# Patient Record
Sex: MALE | Race: WHITE | Employment: FULL TIME | URBAN - METROPOLITAN AREA
[De-identification: names, ages, dates, MRNs, and addresses within clinical notes are randomized per-mention and may not be internally consistent; named-entity substitution may affect disease eponyms.]

---

## 2018-06-08 ENCOUNTER — HOSPITAL ENCOUNTER (OUTPATIENT)
Age: 58
Discharge: HOME | End: 2018-06-08
Payer: COMMERCIAL

## 2018-06-08 LAB
ALBUMIN SERPL-MCNC: 4.3 G/DL (ref 3.5–5.2)
ALP BLD-CCNC: 114 U/L (ref 40–129)
ALT SERPL-CCNC: 17 U/L (ref 0–40)
ANION GAP SERPL CALCULATED.3IONS-SCNC: 11 MMOL/L (ref 7–16)
AST SERPL-CCNC: 16 U/L (ref 0–39)
BASOPHILS ABSOLUTE: 0.06 E9/L (ref 0–0.2)
BASOPHILS RELATIVE PERCENT: 0.7 % (ref 0–2)
BILIRUB SERPL-MCNC: 0.5 MG/DL (ref 0–1.2)
BUN BLDV-MCNC: 24 MG/DL (ref 6–20)
CALCIUM SERPL-MCNC: 9.3 MG/DL (ref 8.6–10.2)
CHLORIDE BLD-SCNC: 102 MMOL/L (ref 98–107)
CHOLESTEROL, FASTING: 124 MG/DL (ref 0–199)
CO2: 26 MMOL/L (ref 22–29)
CREAT SERPL-MCNC: 1 MG/DL (ref 0.7–1.2)
EOSINOPHILS ABSOLUTE: 0.24 E9/L (ref 0.05–0.5)
EOSINOPHILS RELATIVE PERCENT: 3 % (ref 0–6)
GFR AFRICAN AMERICAN: >60
GFR NON-AFRICAN AMERICAN: >60 ML/MIN/1.73
GLUCOSE FASTING: 116 MG/DL (ref 74–109)
HCT VFR BLD CALC: 46.2 % (ref 37–54)
HDLC SERPL-MCNC: 34 MG/DL
HEMOGLOBIN: 15.3 G/DL (ref 12.5–16.5)
IMMATURE GRANULOCYTES #: 0.01 E9/L
IMMATURE GRANULOCYTES %: 0.1 % (ref 0–5)
LDL CHOLESTEROL CALCULATED: 73 MG/DL (ref 0–99)
LYMPHOCYTES ABSOLUTE: 1.46 E9/L (ref 1.5–4)
LYMPHOCYTES RELATIVE PERCENT: 18 % (ref 20–42)
MCH RBC QN AUTO: 29 PG (ref 26–35)
MCHC RBC AUTO-ENTMCNC: 33.1 % (ref 32–34.5)
MCV RBC AUTO: 87.7 FL (ref 80–99.9)
MONOCYTES ABSOLUTE: 0.64 E9/L (ref 0.1–0.95)
MONOCYTES RELATIVE PERCENT: 7.9 % (ref 2–12)
NEUTROPHILS ABSOLUTE: 5.69 E9/L (ref 1.8–7.3)
NEUTROPHILS RELATIVE PERCENT: 70.3 % (ref 43–80)
PDW BLD-RTO: 13.1 FL (ref 11.5–15)
PHOSPHORUS: 2.8 MG/DL (ref 2.5–4.5)
PLATELET # BLD: 249 E9/L (ref 130–450)
PMV BLD AUTO: 9.9 FL (ref 7–12)
POTASSIUM SERPL-SCNC: 4 MMOL/L (ref 3.5–5)
RBC # BLD: 5.27 E12/L (ref 3.8–5.8)
SODIUM BLD-SCNC: 139 MMOL/L (ref 132–146)
TOTAL PROTEIN: 7.8 G/DL (ref 6.4–8.3)
TRIGLYCERIDE, FASTING: 84 MG/DL (ref 0–149)
VLDLC SERPL CALC-MCNC: 17 MG/DL
WBC # BLD: 8.1 E9/L (ref 4.5–11.5)

## 2018-06-08 PROCEDURE — 80061 LIPID PANEL: CPT

## 2018-06-08 PROCEDURE — 84100 ASSAY OF PHOSPHORUS: CPT

## 2018-06-08 PROCEDURE — 80053 COMPREHEN METABOLIC PANEL: CPT

## 2018-06-08 PROCEDURE — 85025 COMPLETE CBC W/AUTO DIFF WBC: CPT

## 2018-06-08 PROCEDURE — 36415 COLL VENOUS BLD VENIPUNCTURE: CPT

## 2018-12-28 ENCOUNTER — HOSPITAL ENCOUNTER (OUTPATIENT)
Age: 58
Discharge: HOME | End: 2018-12-28
Payer: COMMERCIAL

## 2018-12-28 LAB
ALBUMIN SERPL-MCNC: 4.6 G/DL (ref 3.5–5.2)
ALP BLD-CCNC: 131 U/L (ref 40–129)
ALT SERPL-CCNC: 25 U/L (ref 0–40)
ANION GAP SERPL CALCULATED.3IONS-SCNC: 13 MMOL/L (ref 7–16)
AST SERPL-CCNC: 21 U/L (ref 0–39)
BASOPHILS ABSOLUTE: 0.07 E9/L (ref 0–0.2)
BASOPHILS RELATIVE PERCENT: 0.8 % (ref 0–2)
BILIRUB SERPL-MCNC: 0.3 MG/DL (ref 0–1.2)
BUN BLDV-MCNC: 16 MG/DL (ref 6–20)
CALCIUM SERPL-MCNC: 9.4 MG/DL (ref 8.6–10.2)
CHLORIDE BLD-SCNC: 100 MMOL/L (ref 98–107)
CO2: 27 MMOL/L (ref 22–29)
CREAT SERPL-MCNC: 0.9 MG/DL (ref 0.7–1.2)
EOSINOPHILS ABSOLUTE: 0.29 E9/L (ref 0.05–0.5)
EOSINOPHILS RELATIVE PERCENT: 3.1 % (ref 0–6)
GFR AFRICAN AMERICAN: >60
GFR NON-AFRICAN AMERICAN: >60 ML/MIN/1.73
GLUCOSE BLD-MCNC: 130 MG/DL (ref 74–99)
HCT VFR BLD CALC: 49.1 % (ref 37–54)
HEMOGLOBIN: 16.1 G/DL (ref 12.5–16.5)
IMMATURE GRANULOCYTES #: 0.03 E9/L
IMMATURE GRANULOCYTES %: 0.3 % (ref 0–5)
LYMPHOCYTES ABSOLUTE: 1.73 E9/L (ref 1.5–4)
LYMPHOCYTES RELATIVE PERCENT: 18.8 % (ref 20–42)
MCH RBC QN AUTO: 29.9 PG (ref 26–35)
MCHC RBC AUTO-ENTMCNC: 32.8 % (ref 32–34.5)
MCV RBC AUTO: 91.1 FL (ref 80–99.9)
MONOCYTES ABSOLUTE: 0.67 E9/L (ref 0.1–0.95)
MONOCYTES RELATIVE PERCENT: 7.3 % (ref 2–12)
NEUTROPHILS ABSOLUTE: 6.42 E9/L (ref 1.8–7.3)
NEUTROPHILS RELATIVE PERCENT: 69.7 % (ref 43–80)
PDW BLD-RTO: 12.6 FL (ref 11.5–15)
PHOSPHORUS: 2.3 MG/DL (ref 2.5–4.5)
PLATELET # BLD: 253 E9/L (ref 130–450)
PMV BLD AUTO: 9.7 FL (ref 7–12)
POTASSIUM SERPL-SCNC: 3.7 MMOL/L (ref 3.5–5)
RBC # BLD: 5.39 E12/L (ref 3.8–5.8)
SODIUM BLD-SCNC: 140 MMOL/L (ref 132–146)
TOTAL PROTEIN: 8.1 G/DL (ref 6.4–8.3)
VITAMIN D 25-HYDROXY: 39 NG/ML (ref 30–100)
WBC # BLD: 9.2 E9/L (ref 4.5–11.5)

## 2018-12-28 PROCEDURE — 82306 VITAMIN D 25 HYDROXY: CPT

## 2018-12-28 PROCEDURE — 36415 COLL VENOUS BLD VENIPUNCTURE: CPT

## 2018-12-28 PROCEDURE — 85025 COMPLETE CBC W/AUTO DIFF WBC: CPT

## 2018-12-28 PROCEDURE — 84100 ASSAY OF PHOSPHORUS: CPT

## 2018-12-28 PROCEDURE — 80053 COMPREHEN METABOLIC PANEL: CPT

## 2019-07-15 ENCOUNTER — HOSPITAL ENCOUNTER (OUTPATIENT)
Age: 59
Discharge: HOME | End: 2019-07-15
Payer: COMMERCIAL

## 2019-07-15 DIAGNOSIS — I10 ACCELERATED HYPERTENSION: ICD-10-CM

## 2019-07-15 DIAGNOSIS — R73.9 HYPERGLYCEMIA: ICD-10-CM

## 2019-07-15 DIAGNOSIS — N18.30 CRI (CHRONIC RENAL INSUFFICIENCY), STAGE 3 (MODERATE) (HCC): ICD-10-CM

## 2019-07-15 DIAGNOSIS — E78.5 HYPERLIPIDEMIA, UNSPECIFIED HYPERLIPIDEMIA TYPE: ICD-10-CM

## 2019-07-15 DIAGNOSIS — E03.9 ACQUIRED HYPOTHYROIDISM: ICD-10-CM

## 2019-07-15 LAB
ALBUMIN SERPL-MCNC: 4.3 G/DL (ref 3.5–5.2)
ALP BLD-CCNC: 130 U/L (ref 40–129)
ALT SERPL-CCNC: 20 U/L (ref 0–40)
ANION GAP SERPL CALCULATED.3IONS-SCNC: 12 MMOL/L (ref 7–16)
AST SERPL-CCNC: 15 U/L (ref 0–39)
BASOPHILS ABSOLUTE: 0.06 E9/L (ref 0–0.2)
BASOPHILS RELATIVE PERCENT: 0.6 % (ref 0–2)
BILIRUB SERPL-MCNC: 0.4 MG/DL (ref 0–1.2)
BUN BLDV-MCNC: 16 MG/DL (ref 6–20)
CALCIUM SERPL-MCNC: 8.9 MG/DL (ref 8.6–10.2)
CHLORIDE BLD-SCNC: 99 MMOL/L (ref 98–107)
CHOLESTEROL, TOTAL: 169 MG/DL (ref 0–199)
CO2: 29 MMOL/L (ref 22–29)
CREAT SERPL-MCNC: 1.1 MG/DL (ref 0.7–1.2)
CREATININE URINE: 109 MG/DL (ref 40–278)
EOSINOPHILS ABSOLUTE: 0.27 E9/L (ref 0.05–0.5)
EOSINOPHILS RELATIVE PERCENT: 2.9 % (ref 0–6)
GFR AFRICAN AMERICAN: >60
GFR NON-AFRICAN AMERICAN: >60 ML/MIN/1.73
GLUCOSE BLD-MCNC: 173 MG/DL (ref 74–99)
HBA1C MFR BLD: 7.3 % (ref 4–5.6)
HCT VFR BLD CALC: 46.3 % (ref 37–54)
HDLC SERPL-MCNC: 40 MG/DL
HEMOGLOBIN: 15.3 G/DL (ref 12.5–16.5)
IMMATURE GRANULOCYTES #: 0.04 E9/L
IMMATURE GRANULOCYTES %: 0.4 % (ref 0–5)
LDL CHOLESTEROL CALCULATED: 90 MG/DL (ref 0–99)
LYMPHOCYTES ABSOLUTE: 1.46 E9/L (ref 1.5–4)
LYMPHOCYTES RELATIVE PERCENT: 15.4 % (ref 20–42)
MAGNESIUM: 2.1 MG/DL (ref 1.6–2.6)
MCH RBC QN AUTO: 30.3 PG (ref 26–35)
MCHC RBC AUTO-ENTMCNC: 33 % (ref 32–34.5)
MCV RBC AUTO: 91.7 FL (ref 80–99.9)
MICROALBUMIN UR-MCNC: 49.1 MG/L
MICROALBUMIN/CREAT UR-RTO: 45 (ref 0–30)
MONOCYTES ABSOLUTE: 0.66 E9/L (ref 0.1–0.95)
MONOCYTES RELATIVE PERCENT: 7 % (ref 2–12)
NEUTROPHILS ABSOLUTE: 6.96 E9/L (ref 1.8–7.3)
NEUTROPHILS RELATIVE PERCENT: 73.7 % (ref 43–80)
PARATHYROID HORMONE INTACT: 126 PG/ML (ref 15–65)
PDW BLD-RTO: 13.3 FL (ref 11.5–15)
PLATELET # BLD: 236 E9/L (ref 130–450)
PMV BLD AUTO: 9.9 FL (ref 7–12)
POTASSIUM SERPL-SCNC: 3.4 MMOL/L (ref 3.5–5)
RBC # BLD: 5.05 E12/L (ref 3.8–5.8)
SODIUM BLD-SCNC: 140 MMOL/L (ref 132–146)
TOTAL PROTEIN: 8.3 G/DL (ref 6.4–8.3)
TRIGL SERPL-MCNC: 193 MG/DL (ref 0–149)
TSH SERPL DL<=0.05 MIU/L-ACNC: 4.36 UIU/ML (ref 0.27–4.2)
URIC ACID, SERUM: 8.3 MG/DL (ref 3.4–7)
VITAMIN D 25-HYDROXY: 33 NG/ML (ref 30–100)
VLDLC SERPL CALC-MCNC: 39 MG/DL
WBC # BLD: 9.5 E9/L (ref 4.5–11.5)

## 2019-07-15 PROCEDURE — 85025 COMPLETE CBC W/AUTO DIFF WBC: CPT

## 2019-07-15 PROCEDURE — 84550 ASSAY OF BLOOD/URIC ACID: CPT

## 2019-07-15 PROCEDURE — 83970 ASSAY OF PARATHORMONE: CPT

## 2019-07-15 PROCEDURE — 84443 ASSAY THYROID STIM HORMONE: CPT

## 2019-07-15 PROCEDURE — 83735 ASSAY OF MAGNESIUM: CPT

## 2019-07-15 PROCEDURE — 80061 LIPID PANEL: CPT

## 2019-07-15 PROCEDURE — 82044 UR ALBUMIN SEMIQUANTITATIVE: CPT

## 2019-07-15 PROCEDURE — 83036 HEMOGLOBIN GLYCOSYLATED A1C: CPT

## 2019-07-15 PROCEDURE — 36415 COLL VENOUS BLD VENIPUNCTURE: CPT

## 2019-07-15 PROCEDURE — 80053 COMPREHEN METABOLIC PANEL: CPT

## 2019-07-15 PROCEDURE — 82570 ASSAY OF URINE CREATININE: CPT

## 2019-07-15 PROCEDURE — 82306 VITAMIN D 25 HYDROXY: CPT

## 2019-09-21 ENCOUNTER — HOSPITAL ENCOUNTER (OUTPATIENT)
Age: 59
Discharge: HOME | End: 2019-09-21
Payer: COMMERCIAL

## 2019-09-21 DIAGNOSIS — I10 ACCELERATED HYPERTENSION: ICD-10-CM

## 2019-09-21 DIAGNOSIS — N18.30 CHRONIC RENAL IMPAIRMENT, STAGE 3 (MODERATE) (HCC): ICD-10-CM

## 2019-09-21 DIAGNOSIS — E03.9 ACQUIRED HYPOTHYROIDISM: ICD-10-CM

## 2019-09-21 LAB
ANION GAP SERPL CALCULATED.3IONS-SCNC: 13 MMOL/L (ref 7–16)
BUN BLDV-MCNC: 14 MG/DL (ref 6–20)
CALCIUM SERPL-MCNC: 9 MG/DL (ref 8.6–10.2)
CHLORIDE BLD-SCNC: 99 MMOL/L (ref 98–107)
CO2: 27 MMOL/L (ref 22–29)
CREAT SERPL-MCNC: 1 MG/DL (ref 0.7–1.2)
GFR AFRICAN AMERICAN: >60
GFR NON-AFRICAN AMERICAN: >60 ML/MIN/1.73
GLUCOSE BLD-MCNC: 176 MG/DL (ref 74–99)
POTASSIUM SERPL-SCNC: 3.5 MMOL/L (ref 3.5–5)
SODIUM BLD-SCNC: 139 MMOL/L (ref 132–146)
URIC ACID, SERUM: 8.1 MG/DL (ref 3.4–7)

## 2019-09-21 PROCEDURE — 36415 COLL VENOUS BLD VENIPUNCTURE: CPT

## 2019-09-21 PROCEDURE — 84550 ASSAY OF BLOOD/URIC ACID: CPT

## 2019-09-21 PROCEDURE — 80048 BASIC METABOLIC PNL TOTAL CA: CPT

## 2019-09-26 PROBLEM — E66.01 CLASS 3 SEVERE OBESITY DUE TO EXCESS CALORIES WITHOUT SERIOUS COMORBIDITY WITH BODY MASS INDEX (BMI) OF 45.0 TO 49.9 IN ADULT (HCC): Status: ACTIVE | Noted: 2019-09-26

## 2019-09-26 PROBLEM — R73.9 HYPERGLYCEMIA: Status: ACTIVE | Noted: 2019-09-26

## 2019-09-26 PROBLEM — E66.813 CLASS 3 SEVERE OBESITY DUE TO EXCESS CALORIES WITHOUT SERIOUS COMORBIDITY WITH BODY MASS INDEX (BMI) OF 45.0 TO 49.9 IN ADULT: Status: ACTIVE | Noted: 2019-09-26

## 2019-09-26 PROBLEM — E11.9 TYPE 2 DIABETES MELLITUS WITHOUT COMPLICATION, WITHOUT LONG-TERM CURRENT USE OF INSULIN (HCC): Status: ACTIVE | Noted: 2019-09-26

## 2019-09-26 PROBLEM — I10 ESSENTIAL HYPERTENSION: Status: ACTIVE | Noted: 2019-09-26

## 2019-11-30 ENCOUNTER — HOSPITAL ENCOUNTER (OUTPATIENT)
Age: 59
Discharge: HOME | End: 2019-11-30
Payer: COMMERCIAL

## 2019-11-30 LAB
ANION GAP SERPL CALCULATED.3IONS-SCNC: 11 MMOL/L (ref 7–16)
BASOPHILS ABSOLUTE: 0.05 E9/L (ref 0–0.2)
BASOPHILS RELATIVE PERCENT: 0.5 % (ref 0–2)
BUN BLDV-MCNC: 15 MG/DL (ref 6–20)
CALCIUM SERPL-MCNC: 9.6 MG/DL (ref 8.6–10.2)
CHLORIDE BLD-SCNC: 97 MMOL/L (ref 98–107)
CO2: 33 MMOL/L (ref 22–29)
CREAT SERPL-MCNC: 1.1 MG/DL (ref 0.7–1.2)
EOSINOPHILS ABSOLUTE: 0.32 E9/L (ref 0.05–0.5)
EOSINOPHILS RELATIVE PERCENT: 3.2 % (ref 0–6)
GFR AFRICAN AMERICAN: >60
GFR NON-AFRICAN AMERICAN: >60 ML/MIN/1.73
GLUCOSE BLD-MCNC: 133 MG/DL (ref 74–99)
HCT VFR BLD CALC: 47 % (ref 37–54)
HEMOGLOBIN: 15.2 G/DL (ref 12.5–16.5)
IMMATURE GRANULOCYTES #: 0.04 E9/L
IMMATURE GRANULOCYTES %: 0.4 % (ref 0–5)
LYMPHOCYTES ABSOLUTE: 1.45 E9/L (ref 1.5–4)
LYMPHOCYTES RELATIVE PERCENT: 14.4 % (ref 20–42)
MCH RBC QN AUTO: 30.2 PG (ref 26–35)
MCHC RBC AUTO-ENTMCNC: 32.3 % (ref 32–34.5)
MCV RBC AUTO: 93.3 FL (ref 80–99.9)
MONOCYTES ABSOLUTE: 0.62 E9/L (ref 0.1–0.95)
MONOCYTES RELATIVE PERCENT: 6.2 % (ref 2–12)
NEUTROPHILS ABSOLUTE: 7.58 E9/L (ref 1.8–7.3)
NEUTROPHILS RELATIVE PERCENT: 75.3 % (ref 43–80)
PDW BLD-RTO: 13.4 FL (ref 11.5–15)
PHOSPHORUS: 3 MG/DL (ref 2.5–4.5)
PLATELET # BLD: 253 E9/L (ref 130–450)
PMV BLD AUTO: 10.3 FL (ref 7–12)
POTASSIUM SERPL-SCNC: 4.2 MMOL/L (ref 3.5–5)
RBC # BLD: 5.04 E12/L (ref 3.8–5.8)
SODIUM BLD-SCNC: 141 MMOL/L (ref 132–146)
WBC # BLD: 10.1 E9/L (ref 4.5–11.5)

## 2019-11-30 PROCEDURE — 80048 BASIC METABOLIC PNL TOTAL CA: CPT

## 2019-11-30 PROCEDURE — 36415 COLL VENOUS BLD VENIPUNCTURE: CPT

## 2019-11-30 PROCEDURE — 84100 ASSAY OF PHOSPHORUS: CPT

## 2019-11-30 PROCEDURE — 85025 COMPLETE CBC W/AUTO DIFF WBC: CPT

## 2020-09-12 ENCOUNTER — HOSPITAL ENCOUNTER (OUTPATIENT)
Age: 60
Discharge: HOME | End: 2020-09-12
Payer: COMMERCIAL

## 2020-09-12 LAB
BASOPHILS ABSOLUTE: 0.04 E9/L (ref 0–0.2)
BASOPHILS RELATIVE PERCENT: 0.5 % (ref 0–2)
EOSINOPHILS ABSOLUTE: 0.21 E9/L (ref 0.05–0.5)
EOSINOPHILS RELATIVE PERCENT: 2.6 % (ref 0–6)
HCT VFR BLD CALC: 38.5 % (ref 37–54)
HEMOGLOBIN: 11.7 G/DL (ref 12.5–16.5)
IMMATURE GRANULOCYTES #: 0.03 E9/L
IMMATURE GRANULOCYTES %: 0.4 % (ref 0–5)
LYMPHOCYTES ABSOLUTE: 0.95 E9/L (ref 1.5–4)
LYMPHOCYTES RELATIVE PERCENT: 11.5 % (ref 20–42)
MCH RBC QN AUTO: 27 PG (ref 26–35)
MCHC RBC AUTO-ENTMCNC: 30.4 % (ref 32–34.5)
MCV RBC AUTO: 88.9 FL (ref 80–99.9)
MONOCYTES ABSOLUTE: 0.6 E9/L (ref 0.1–0.95)
MONOCYTES RELATIVE PERCENT: 7.3 % (ref 2–12)
NEUTROPHILS ABSOLUTE: 6.4 E9/L (ref 1.8–7.3)
NEUTROPHILS RELATIVE PERCENT: 77.7 % (ref 43–80)
PDW BLD-RTO: 14.7 FL (ref 11.5–15)
PLATELET # BLD: 275 E9/L (ref 130–450)
PMV BLD AUTO: 9.7 FL (ref 7–12)
RBC # BLD: 4.33 E12/L (ref 3.8–5.8)
T4 FREE: 1.15 NG/DL (ref 0.93–1.7)
TSH SERPL DL<=0.05 MIU/L-ACNC: 6.93 UIU/ML (ref 0.27–4.2)
WBC # BLD: 8.2 E9/L (ref 4.5–11.5)

## 2020-09-12 PROCEDURE — 84439 ASSAY OF FREE THYROXINE: CPT

## 2020-09-12 PROCEDURE — 85025 COMPLETE CBC W/AUTO DIFF WBC: CPT

## 2020-09-12 PROCEDURE — 36415 COLL VENOUS BLD VENIPUNCTURE: CPT

## 2020-09-12 PROCEDURE — 84443 ASSAY THYROID STIM HORMONE: CPT

## 2020-10-07 ENCOUNTER — APPOINTMENT (OUTPATIENT)
Dept: ULTRASOUND IMAGING | Age: 60
DRG: 286 | End: 2020-10-07
Payer: COMMERCIAL

## 2020-10-07 ENCOUNTER — HOSPITAL ENCOUNTER (INPATIENT)
Age: 60
LOS: 5 days | Discharge: HOME | DRG: 286 | End: 2020-10-12
Attending: EMERGENCY MEDICINE | Admitting: INTERNAL MEDICINE
Payer: COMMERCIAL

## 2020-10-07 ENCOUNTER — APPOINTMENT (OUTPATIENT)
Dept: GENERAL RADIOLOGY | Age: 60
DRG: 286 | End: 2020-10-07
Payer: COMMERCIAL

## 2020-10-07 PROBLEM — E87.70 LOCALIZED EDEMA DUE TO FLUID OVERLOAD: Status: ACTIVE | Noted: 2020-10-07

## 2020-10-07 LAB
ALBUMIN SERPL-MCNC: 3.5 G/DL (ref 3.5–5.2)
ALP BLD-CCNC: 74 U/L (ref 40–129)
ALT SERPL-CCNC: 18 U/L (ref 0–40)
ANION GAP SERPL CALCULATED.3IONS-SCNC: 9 MMOL/L (ref 7–16)
AST SERPL-CCNC: 31 U/L (ref 0–39)
BACTERIA: NORMAL /HPF
BASOPHILS ABSOLUTE: 0.06 E9/L (ref 0–0.2)
BASOPHILS RELATIVE PERCENT: 0.5 % (ref 0–2)
BILIRUB SERPL-MCNC: 0.4 MG/DL (ref 0–1.2)
BILIRUBIN URINE: NEGATIVE
BLOOD, URINE: NEGATIVE
BUN BLDV-MCNC: 16 MG/DL (ref 6–20)
CALCIUM SERPL-MCNC: 8.5 MG/DL (ref 8.6–10.2)
CHLORIDE BLD-SCNC: 98 MMOL/L (ref 98–107)
CLARITY: CLEAR
CO2: 32 MMOL/L (ref 22–29)
COLOR: YELLOW
CREAT SERPL-MCNC: 1.3 MG/DL (ref 0.7–1.2)
EOSINOPHILS ABSOLUTE: 0.17 E9/L (ref 0.05–0.5)
EOSINOPHILS RELATIVE PERCENT: 1.4 % (ref 0–6)
GFR AFRICAN AMERICAN: >60
GFR NON-AFRICAN AMERICAN: 57 ML/MIN/1.73
GLUCOSE BLD-MCNC: 218 MG/DL (ref 74–99)
GLUCOSE URINE: NEGATIVE MG/DL
HCT VFR BLD CALC: 40.7 % (ref 37–54)
HEMOGLOBIN: 11.9 G/DL (ref 12.5–16.5)
IMMATURE GRANULOCYTES #: 0.06 E9/L
IMMATURE GRANULOCYTES %: 0.5 % (ref 0–5)
KETONES, URINE: NEGATIVE MG/DL
LACTIC ACID, SEPSIS: 1.4 MMOL/L (ref 0.5–1.9)
LEUKOCYTE ESTERASE, URINE: NEGATIVE
LIPASE: 26 U/L (ref 13–60)
LYMPHOCYTES ABSOLUTE: 1.03 E9/L (ref 1.5–4)
LYMPHOCYTES RELATIVE PERCENT: 8.7 % (ref 20–42)
MCH RBC QN AUTO: 25.9 PG (ref 26–35)
MCHC RBC AUTO-ENTMCNC: 29.2 % (ref 32–34.5)
MCV RBC AUTO: 88.7 FL (ref 80–99.9)
MONOCYTES ABSOLUTE: 0.72 E9/L (ref 0.1–0.95)
MONOCYTES RELATIVE PERCENT: 6.1 % (ref 2–12)
NEUTROPHILS ABSOLUTE: 9.86 E9/L (ref 1.8–7.3)
NEUTROPHILS RELATIVE PERCENT: 82.8 % (ref 43–80)
NITRITE, URINE: NEGATIVE
PDW BLD-RTO: 16.1 FL (ref 11.5–15)
PH UA: 6 (ref 5–9)
PLATELET # BLD: 268 E9/L (ref 130–450)
PMV BLD AUTO: 9.4 FL (ref 7–12)
POTASSIUM REFLEX MAGNESIUM: 4.2 MMOL/L (ref 3.5–5)
POTASSIUM SERPL-SCNC: 3.8 MMOL/L (ref 3.5–5)
PRO-BNP: 2277 PG/ML (ref 0–125)
PROTEIN UA: 30 MG/DL
RBC # BLD: 4.59 E12/L (ref 3.8–5.8)
RBC UA: NORMAL /HPF (ref 0–2)
SARS-COV-2, NAAT: NOT DETECTED
SODIUM BLD-SCNC: 139 MMOL/L (ref 132–146)
SPECIFIC GRAVITY UA: 1.02 (ref 1–1.03)
TOTAL PROTEIN: 7.1 G/DL (ref 6.4–8.3)
TROPONIN: 0.13 NG/ML (ref 0–0.03)
TROPONIN: 0.17 NG/ML (ref 0–0.03)
UROBILINOGEN, URINE: 1 E.U./DL
WBC # BLD: 11.9 E9/L (ref 4.5–11.5)
WBC UA: NORMAL /HPF (ref 0–5)

## 2020-10-07 PROCEDURE — U0002 COVID-19 LAB TEST NON-CDC: HCPCS

## 2020-10-07 PROCEDURE — 93005 ELECTROCARDIOGRAM TRACING: CPT | Performed by: NURSE PRACTITIONER

## 2020-10-07 PROCEDURE — 87040 BLOOD CULTURE FOR BACTERIA: CPT

## 2020-10-07 PROCEDURE — 84484 ASSAY OF TROPONIN QUANT: CPT

## 2020-10-07 PROCEDURE — 2060000000 HC ICU INTERMEDIATE R&B

## 2020-10-07 PROCEDURE — 81001 URINALYSIS AUTO W/SCOPE: CPT

## 2020-10-07 PROCEDURE — 80053 COMPREHEN METABOLIC PANEL: CPT

## 2020-10-07 PROCEDURE — 99285 EMERGENCY DEPT VISIT HI MDM: CPT

## 2020-10-07 PROCEDURE — 93970 EXTREMITY STUDY: CPT | Performed by: RADIOLOGY

## 2020-10-07 PROCEDURE — 83880 ASSAY OF NATRIURETIC PEPTIDE: CPT

## 2020-10-07 PROCEDURE — 93970 EXTREMITY STUDY: CPT

## 2020-10-07 PROCEDURE — 85025 COMPLETE CBC W/AUTO DIFF WBC: CPT

## 2020-10-07 PROCEDURE — 83605 ASSAY OF LACTIC ACID: CPT

## 2020-10-07 PROCEDURE — 71046 X-RAY EXAM CHEST 2 VIEWS: CPT

## 2020-10-07 PROCEDURE — 84132 ASSAY OF SERUM POTASSIUM: CPT

## 2020-10-07 PROCEDURE — 36415 COLL VENOUS BLD VENIPUNCTURE: CPT

## 2020-10-07 PROCEDURE — 6360000002 HC RX W HCPCS: Performed by: EMERGENCY MEDICINE

## 2020-10-07 PROCEDURE — 83690 ASSAY OF LIPASE: CPT

## 2020-10-07 RX ORDER — FUROSEMIDE 10 MG/ML
40 INJECTION INTRAMUSCULAR; INTRAVENOUS ONCE
Status: COMPLETED | OUTPATIENT
Start: 2020-10-07 | End: 2020-10-07

## 2020-10-07 RX ADMIN — FUROSEMIDE 40 MG: 10 INJECTION, SOLUTION INTRAMUSCULAR; INTRAVENOUS at 20:38

## 2020-10-07 ASSESSMENT — PAIN SCALES - GENERAL: PAINLEVEL_OUTOF10: 0

## 2020-10-07 NOTE — ED NOTES
FIRST PROVIDER CONTACT ASSESSMENT NOTE      Department of Emergency Medicine   10/7/20  2:51 PM EDT    Chief Complaint: Fatigue (increased fatigue x5 days, cough/congestion/SOB, BLE swelling )      History of Present Illness:   Jimmie Smith is a 64 y.o. male who presents to the ED for [address] for the last 5 days with shortness of breath and bilateral lower extremity swelling. Medical History:  has a past medical history of Hypertension and Hypothyroidism. Surgical History:  has no past surgical history on file. Social History:  reports that he has never smoked. He has never used smokeless tobacco. He reports that he does not drink alcohol or use drugs. Family History: family history includes Arthritis in his mother; Cancer in his mother; Diabetes in his father; Stroke in his father. *ALLERGIES*     Patient has no known allergies.      Physical Exam:      VS:  BP (!) 165/89   Pulse 96   Temp 98.2 °F (36.8 °C) (Temporal)   Resp (!) 2   SpO2 90%      Initial Plan of Care:  Initiate Treatment-Testing, Proceed toTreatment Area When Bed Available for ED Attending/MLP to Continue Care    -----------------[address] ASSESSMENT NOTE--------------  Electronically signed by BRITTNEY Garza CNP   DD: 10/7/20           BRITTNEY Garza CNP  10/07/20 0906

## 2020-10-08 PROBLEM — E87.70 FLUID OVERLOAD: Status: ACTIVE | Noted: 2020-10-08

## 2020-10-08 LAB
ANION GAP SERPL CALCULATED.3IONS-SCNC: 8 MMOL/L (ref 7–16)
BUN BLDV-MCNC: 14 MG/DL (ref 6–20)
CALCIUM SERPL-MCNC: 8.3 MG/DL (ref 8.6–10.2)
CHLORIDE BLD-SCNC: 100 MMOL/L (ref 98–107)
CO2: 35 MMOL/L (ref 22–29)
CREAT SERPL-MCNC: 1.3 MG/DL (ref 0.7–1.2)
EKG ATRIAL RATE: 84 BPM
EKG P AXIS: 59 DEGREES
EKG P-R INTERVAL: 200 MS
EKG Q-T INTERVAL: 388 MS
EKG QRS DURATION: 82 MS
EKG QTC CALCULATION (BAZETT): 458 MS
EKG R AXIS: 24 DEGREES
EKG T AXIS: 85 DEGREES
EKG VENTRICULAR RATE: 84 BPM
GFR AFRICAN AMERICAN: >60
GFR NON-AFRICAN AMERICAN: 57 ML/MIN/1.73
GLUCOSE BLD-MCNC: 165 MG/DL (ref 74–99)
METER GLUCOSE: 160 MG/DL (ref 74–99)
METER GLUCOSE: 166 MG/DL (ref 74–99)
METER GLUCOSE: 189 MG/DL (ref 74–99)
METER GLUCOSE: 202 MG/DL (ref 74–99)
POTASSIUM SERPL-SCNC: 3.6 MMOL/L (ref 3.5–5)
SODIUM BLD-SCNC: 143 MMOL/L (ref 132–146)
TROPONIN: 0.15 NG/ML (ref 0–0.03)

## 2020-10-08 PROCEDURE — 36415 COLL VENOUS BLD VENIPUNCTURE: CPT

## 2020-10-08 PROCEDURE — 6360000002 HC RX W HCPCS: Performed by: STUDENT IN AN ORGANIZED HEALTH CARE EDUCATION/TRAINING PROGRAM

## 2020-10-08 PROCEDURE — 6360000002 HC RX W HCPCS: Performed by: INTERNAL MEDICINE

## 2020-10-08 PROCEDURE — 6370000000 HC RX 637 (ALT 250 FOR IP): Performed by: INTERNAL MEDICINE

## 2020-10-08 PROCEDURE — 2700000000 HC OXYGEN THERAPY PER DAY

## 2020-10-08 PROCEDURE — 84484 ASSAY OF TROPONIN QUANT: CPT

## 2020-10-08 PROCEDURE — 82962 GLUCOSE BLOOD TEST: CPT

## 2020-10-08 PROCEDURE — 93010 ELECTROCARDIOGRAM REPORT: CPT | Performed by: INTERNAL MEDICINE

## 2020-10-08 PROCEDURE — 6360000002 HC RX W HCPCS: Performed by: PHYSICIAN ASSISTANT

## 2020-10-08 PROCEDURE — APPSS60 APP SPLIT SHARED TIME 46-60 MINUTES: Performed by: PHYSICIAN ASSISTANT

## 2020-10-08 PROCEDURE — 99254 IP/OBS CNSLTJ NEW/EST MOD 60: CPT | Performed by: STUDENT IN AN ORGANIZED HEALTH CARE EDUCATION/TRAINING PROGRAM

## 2020-10-08 PROCEDURE — 6370000000 HC RX 637 (ALT 250 FOR IP): Performed by: PHYSICIAN ASSISTANT

## 2020-10-08 PROCEDURE — 80048 BASIC METABOLIC PNL TOTAL CA: CPT

## 2020-10-08 PROCEDURE — 6370000000 HC RX 637 (ALT 250 FOR IP): Performed by: STUDENT IN AN ORGANIZED HEALTH CARE EDUCATION/TRAINING PROGRAM

## 2020-10-08 PROCEDURE — 94664 DEMO&/EVAL PT USE INHALER: CPT

## 2020-10-08 PROCEDURE — 94640 AIRWAY INHALATION TREATMENT: CPT

## 2020-10-08 PROCEDURE — 2580000003 HC RX 258: Performed by: INTERNAL MEDICINE

## 2020-10-08 PROCEDURE — 2060000000 HC ICU INTERMEDIATE R&B

## 2020-10-08 RX ORDER — VITAMIN E 268 MG
400 CAPSULE ORAL DAILY
Status: DISCONTINUED | OUTPATIENT
Start: 2020-10-08 | End: 2020-10-12 | Stop reason: HOSPADM

## 2020-10-08 RX ORDER — DOXYCYCLINE HYCLATE 100 MG/1
100 CAPSULE ORAL EVERY 12 HOURS SCHEDULED
Status: DISCONTINUED | OUTPATIENT
Start: 2020-10-08 | End: 2020-10-12 | Stop reason: HOSPADM

## 2020-10-08 RX ORDER — IPRATROPIUM BROMIDE AND ALBUTEROL SULFATE 2.5; .5 MG/3ML; MG/3ML
1 SOLUTION RESPIRATORY (INHALATION)
Status: DISCONTINUED | OUTPATIENT
Start: 2020-10-08 | End: 2020-10-12 | Stop reason: HOSPADM

## 2020-10-08 RX ORDER — ATORVASTATIN CALCIUM 10 MG/1
10 TABLET, FILM COATED ORAL DAILY
Status: DISCONTINUED | OUTPATIENT
Start: 2020-10-08 | End: 2020-10-12 | Stop reason: HOSPADM

## 2020-10-08 RX ORDER — HYDRALAZINE HYDROCHLORIDE 50 MG/1
50 TABLET, FILM COATED ORAL 3 TIMES DAILY
Status: DISCONTINUED | OUTPATIENT
Start: 2020-10-08 | End: 2020-10-08

## 2020-10-08 RX ORDER — FUROSEMIDE 10 MG/ML
80 INJECTION INTRAMUSCULAR; INTRAVENOUS 3 TIMES DAILY
Status: DISCONTINUED | OUTPATIENT
Start: 2020-10-08 | End: 2020-10-10

## 2020-10-08 RX ORDER — FUROSEMIDE 10 MG/ML
40 INJECTION INTRAMUSCULAR; INTRAVENOUS 2 TIMES DAILY
Status: DISCONTINUED | OUTPATIENT
Start: 2020-10-08 | End: 2020-10-08

## 2020-10-08 RX ORDER — OMEGA-3S/DHA/EPA/FISH OIL/D3 300MG-1000
400 CAPSULE ORAL DAILY
Status: DISCONTINUED | OUTPATIENT
Start: 2020-10-08 | End: 2020-10-12 | Stop reason: HOSPADM

## 2020-10-08 RX ORDER — PROMETHAZINE HYDROCHLORIDE 25 MG/1
12.5 TABLET ORAL EVERY 6 HOURS PRN
Status: DISCONTINUED | OUTPATIENT
Start: 2020-10-08 | End: 2020-10-12 | Stop reason: HOSPADM

## 2020-10-08 RX ORDER — POLYETHYLENE GLYCOL 3350 17 G/17G
17 POWDER, FOR SOLUTION ORAL DAILY PRN
Status: DISCONTINUED | OUTPATIENT
Start: 2020-10-08 | End: 2020-10-12 | Stop reason: HOSPADM

## 2020-10-08 RX ORDER — ACETAMINOPHEN 325 MG/1
650 TABLET ORAL EVERY 6 HOURS PRN
Status: DISCONTINUED | OUTPATIENT
Start: 2020-10-08 | End: 2020-10-12 | Stop reason: SDUPTHER

## 2020-10-08 RX ORDER — ACETAMINOPHEN 650 MG/1
650 SUPPOSITORY RECTAL EVERY 6 HOURS PRN
Status: DISCONTINUED | OUTPATIENT
Start: 2020-10-08 | End: 2020-10-12 | Stop reason: HOSPADM

## 2020-10-08 RX ORDER — DEXTROSE MONOHYDRATE 50 MG/ML
100 INJECTION, SOLUTION INTRAVENOUS PRN
Status: DISCONTINUED | OUTPATIENT
Start: 2020-10-08 | End: 2020-10-12 | Stop reason: HOSPADM

## 2020-10-08 RX ORDER — METOPROLOL SUCCINATE 100 MG/1
100 TABLET, EXTENDED RELEASE ORAL EVERY MORNING
Status: DISCONTINUED | OUTPATIENT
Start: 2020-10-08 | End: 2020-10-08

## 2020-10-08 RX ORDER — AMLODIPINE BESYLATE 10 MG/1
10 TABLET ORAL DAILY
Status: DISCONTINUED | OUTPATIENT
Start: 2020-10-08 | End: 2020-10-12 | Stop reason: HOSPADM

## 2020-10-08 RX ORDER — FUROSEMIDE 20 MG/1
20 TABLET ORAL 2 TIMES DAILY WITH MEALS
Status: DISCONTINUED | OUTPATIENT
Start: 2020-10-08 | End: 2020-10-08

## 2020-10-08 RX ORDER — LOSARTAN POTASSIUM 50 MG/1
100 TABLET ORAL DAILY
Status: DISCONTINUED | OUTPATIENT
Start: 2020-10-08 | End: 2020-10-12 | Stop reason: HOSPADM

## 2020-10-08 RX ORDER — SODIUM CHLORIDE 0.9 % (FLUSH) 0.9 %
10 SYRINGE (ML) INJECTION PRN
Status: DISCONTINUED | OUTPATIENT
Start: 2020-10-08 | End: 2020-10-12 | Stop reason: SDUPTHER

## 2020-10-08 RX ORDER — DEXTROSE MONOHYDRATE 25 G/50ML
12.5 INJECTION, SOLUTION INTRAVENOUS PRN
Status: DISCONTINUED | OUTPATIENT
Start: 2020-10-08 | End: 2020-10-12 | Stop reason: HOSPADM

## 2020-10-08 RX ORDER — HYDRALAZINE HYDROCHLORIDE 25 MG/1
25 TABLET, FILM COATED ORAL 3 TIMES DAILY
Status: DISCONTINUED | OUTPATIENT
Start: 2020-10-08 | End: 2020-10-09

## 2020-10-08 RX ORDER — NICOTINE POLACRILEX 4 MG
15 LOZENGE BUCCAL PRN
Status: DISCONTINUED | OUTPATIENT
Start: 2020-10-08 | End: 2020-10-12 | Stop reason: HOSPADM

## 2020-10-08 RX ORDER — LEVOTHYROXINE SODIUM 0.1 MG/1
100 TABLET ORAL DAILY
Status: DISCONTINUED | OUTPATIENT
Start: 2020-10-08 | End: 2020-10-12 | Stop reason: HOSPADM

## 2020-10-08 RX ORDER — SODIUM CHLORIDE 0.9 % (FLUSH) 0.9 %
10 SYRINGE (ML) INJECTION EVERY 12 HOURS SCHEDULED
Status: DISCONTINUED | OUTPATIENT
Start: 2020-10-08 | End: 2020-10-12 | Stop reason: SDUPTHER

## 2020-10-08 RX ORDER — POTASSIUM CHLORIDE 20 MEQ/1
20 TABLET, EXTENDED RELEASE ORAL 2 TIMES DAILY WITH MEALS
Status: DISCONTINUED | OUTPATIENT
Start: 2020-10-08 | End: 2020-10-12 | Stop reason: HOSPADM

## 2020-10-08 RX ORDER — ONDANSETRON 2 MG/ML
4 INJECTION INTRAMUSCULAR; INTRAVENOUS EVERY 6 HOURS PRN
Status: DISCONTINUED | OUTPATIENT
Start: 2020-10-08 | End: 2020-10-12 | Stop reason: HOSPADM

## 2020-10-08 RX ADMIN — HYDRALAZINE HYDROCHLORIDE 75 MG: 25 TABLET, FILM COATED ORAL at 15:58

## 2020-10-08 RX ADMIN — FUROSEMIDE 40 MG: 10 INJECTION, SOLUTION INTRAVENOUS at 15:58

## 2020-10-08 RX ADMIN — POTASSIUM CHLORIDE 20 MEQ: 1500 TABLET, EXTENDED RELEASE ORAL at 10:05

## 2020-10-08 RX ADMIN — ATORVASTATIN CALCIUM 10 MG: 10 TABLET, FILM COATED ORAL at 10:05

## 2020-10-08 RX ADMIN — Medication 400 UNITS: at 10:05

## 2020-10-08 RX ADMIN — CHOLECALCIFEROL TAB 10 MCG (400 UNIT) 400 UNITS: 10 TAB at 10:06

## 2020-10-08 RX ADMIN — ENOXAPARIN SODIUM 40 MG: 40 INJECTION SUBCUTANEOUS at 10:06

## 2020-10-08 RX ADMIN — METFORMIN HYDROCHLORIDE 1000 MG: 1000 TABLET ORAL at 10:05

## 2020-10-08 RX ADMIN — MAGNESIUM GLUCONATE 500 MG ORAL TABLET 400 MG: 500 TABLET ORAL at 10:05

## 2020-10-08 RX ADMIN — SODIUM CHLORIDE, PRESERVATIVE FREE 10 ML: 5 INJECTION INTRAVENOUS at 10:06

## 2020-10-08 RX ADMIN — LEVOTHYROXINE SODIUM 100 MCG: 0.1 TABLET ORAL at 05:33

## 2020-10-08 RX ADMIN — FUROSEMIDE 80 MG: 10 INJECTION, SOLUTION INTRAVENOUS at 20:23

## 2020-10-08 RX ADMIN — DOXYCYCLINE HYCLATE 100 MG: 100 CAPSULE ORAL at 10:05

## 2020-10-08 RX ADMIN — SODIUM CHLORIDE, PRESERVATIVE FREE 10 ML: 5 INJECTION INTRAVENOUS at 20:23

## 2020-10-08 RX ADMIN — METFORMIN HYDROCHLORIDE 1000 MG: 1000 TABLET ORAL at 18:15

## 2020-10-08 RX ADMIN — POTASSIUM CHLORIDE 20 MEQ: 1500 TABLET, EXTENDED RELEASE ORAL at 18:16

## 2020-10-08 RX ADMIN — HYDRALAZINE HYDROCHLORIDE 25 MG: 25 TABLET, FILM COATED ORAL at 20:22

## 2020-10-08 RX ADMIN — AMLODIPINE BESYLATE 10 MG: 10 TABLET ORAL at 18:16

## 2020-10-08 RX ADMIN — FUROSEMIDE 20 MG: 20 TABLET ORAL at 10:05

## 2020-10-08 RX ADMIN — LOSARTAN POTASSIUM 100 MG: 50 TABLET, FILM COATED ORAL at 10:05

## 2020-10-08 RX ADMIN — IPRATROPIUM BROMIDE AND ALBUTEROL SULFATE 1 AMPULE: 2.5; .5 SOLUTION RESPIRATORY (INHALATION) at 11:35

## 2020-10-08 RX ADMIN — DOXYCYCLINE HYCLATE 100 MG: 100 CAPSULE ORAL at 20:22

## 2020-10-08 RX ADMIN — HYDRALAZINE HYDROCHLORIDE 50 MG: 50 TABLET, FILM COATED ORAL at 10:05

## 2020-10-08 RX ADMIN — INSULIN LISPRO 1 UNITS: 100 INJECTION, SOLUTION INTRAVENOUS; SUBCUTANEOUS at 20:32

## 2020-10-08 RX ADMIN — METOPROLOL SUCCINATE 100 MG: 100 TABLET, EXTENDED RELEASE ORAL at 10:05

## 2020-10-08 ASSESSMENT — PAIN SCALES - GENERAL
PAINLEVEL_OUTOF10: 0

## 2020-10-08 NOTE — CONSULTS
Inpatient Cardiology Consultation      Reason for Consult:  Volume overload, CHF     Consulting Physician: Dr Christelle Hutton    Requesting Physician:  Dr Angelina Armstrong    Date of Consultation: 10/8/2020    HISTORY OF PRESENT ILLNESS:   Mr Danielle Rios is a 61-year-old  male who was previously seeing Dr. Lola Dior, has not seen cardiology since 6/2016. He has a past medical history that includes heart failure with preserved EF, hypertension, hypothyroidism, chronic kidney disease, non-insulin-requiring type II diabetes mellitus (A1c 7.7 on 5/22/2020). [facility] and lower extremity edema/abdominal edema, shortness of breath. VS: 98.2-96-20-90% RA-165/89 --> placed on 6 L nasal cannula with O2 improvement 95%  Labs: WBC 11.9, H&H 11.9/40.7, platelets 257. K+ 4.2, BUN/SCR 16/1.3 (1.1 11/30/2019), glucose 218. Troponin 0.13, 0.17, , proBNP 2277  UA negative  COVID-19 negative    Lower extremity ultrasound negative for DVT. Chest x-ray suggestive of pulmonary vascular congestion    He was given IV Lasix 40 mg x 1 in the emergency department and started on 20 mg p.o. twice daily for admission. He is +105 since his admission which I am suspicious these are an accurate I's and O recording. Pulmonology was consulted. Cardiology was asked see the patient for heart failure. Labs today K+ 3.6, creatinine 1.3. He states that over the last 5 to 6 weeks he has had increasing peripheral edema including his abdomen, shortness of breath, orthopnea, PND, decreased activity level. He states that he works as a heavy  and his life is very sedentary at baseline but recently he has been able to complete less and less activities that he normally does. His wife is at the bedside who assists with history and states that they are quite active going camping and doing other activities but recently he is having difficulty putting his socks and shoes on for work.   She states that he normally is a big eater but recently has been getting full very quickly and not eating as much as he can, despite not being able to lose any weight. She states that they went to his family doctor for his peripheral edema and his belly feeling very firm and had his Lasix increased to 40 mg in the morning instead of 20 mg twice daily. He states that he is not very consistent with his Lasix medications at home and sometimes does not take it as directed. He also had his hydralazine and his Toprol increased at his primary care office. He states that he cannot remember a sudden onset of the symptoms in more or less a gradual onset. He states that since his admission he feels slightly better after the 1 dose of Lasix but is still significantly \" full and swollen\". Please note: past medical records were reviewed per electronic medical record (EMR) - see detailed reports under Past Medical/ Surgical History. Past Medical/surgical history:    1. HFpEF  2. Echocardiogram 2/19/2016 [facility]):  Normal left ventricle size and systolic function EF 97%,   Moderate concentric left ventricular hypertrophy   Moderately dilated left atrium  Moderately dilated right  Ventricle  Borderline reduced right ventricle systolic function  Markedly  enlarged right atrium size. Mild mitral regurgitation  Moderate tricuspid regurgitation. RVSP is 55 mmHg. Stage III diastolic dysfunction  3. [facility] nuclear medicine stress test 2/28/2016: Small fixed perfusion defect over the apical left ventricular myocardium with hypokinetic wall motion suggestive of scarring, no reversible defect noted. EF calculated at 55%  4. Hypertension  5. Hypothyroidism  6. Chronic kidney disease  7. Non-insulin-requiring type 2 diabetes mellitus, A1c 7.7 5/22/2020  8. Lifelong non-smoker      Medications Prior to admit:  Prior to Admission medications    Medication Sig Start Date End Date Taking?  Authorizing Provider   potassium chloride (KLOR-CON M20) 20 MEQ extended release tablet TAKE 1 TABLET BY MOUTH TWICE A DAY 7/2/20  Yes Bryant Garcia MD   metoprolol succinate (TOPROL XL) 100 MG extended release tablet Take 1 tablet by mouth every morning 7/2/20  Yes Bryant Garcia MD   metoprolol succinate (TOPROL XL) 25 MG extended release tablet Take one daily 7/2/20  Yes Bryant Garcia MD   atorvastatin (LIPITOR) 10 MG tablet Take one by mouth on Mondays, Wednesdays, and Fridays 7/2/20  Yes Bryant Garcia MD   levothyroxine (SYNTHROID) 100 MCG tablet Take 1 tablet by mouth Daily 5/6/20  Yes Bryant Garcia MD   hydrALAZINE (APRESOLINE) 50 MG tablet Take 50 mg by mouth 3 times daily   Yes Historical Provider, MD   mirabegron (MYRBETRIQ) 50 MG TB24 Take 50 mg by mouth daily   Yes Historical Provider, MD   metFORMIN (GLUCOPHAGE) 500 MG tablet Take 1 tablet by mouth 2 times daily (with meals) 1/16/20  Yes Bryant Garcia MD   furosemide (LASIX) 20 MG tablet TAKE 1 TABLET BY MOUTH TWICE A DAY 12/5/19  Yes Bryant Garcia MD   vitamin D3 (CHOLECALCIFEROL) 400 units TABS tablet Take 400 Units by mouth daily   Yes Historical Provider, MD   vitamin E 400 UNIT capsule Take 400 Units by mouth daily   Yes Historical Provider, MD   magnesium oxide (MAG-OX) 400 MG tablet TAKE 1 TABLET BY MOUTH DAILY 10/22/18  Yes Bryant Garcia MD   Multiple Vitamins-Minerals (THERAPEUTIC MULTIVITAMIN-MINERALS) tablet Take 1 tablet by mouth daily   Yes Historical Provider, MD   losartan (COZAAR) 100 MG tablet Take 1 tablet by mouth daily 7/2/20 9/30/20  Bryant Garcia MD   metFORMIN (GLUCOPHAGE) 1000 MG tablet Take 1 tablet by mouth 2 times daily (with meals) 5/22/20   Bryant Garcia MD       Current Medications:    Current Facility-Administered Medications: metFORMIN (GLUCOPHAGE) tablet 1,000 mg, 1,000 mg, Oral, BID WC  atorvastatin (LIPITOR) tablet 10 mg, 10 mg, Oral, Daily  furosemide (LASIX) tablet 20 mg, 20 mg, Oral, BID   hydrALAZINE (APRESOLINE) tablet 50 mg, 50 mg, Oral, TID  levothyroxine (SYNTHROID) tablet 100 mcg, 100 mcg, Oral, Daily  losartan (COZAAR) tablet 100 mg, 100 mg, Oral, Daily  magnesium oxide (MAG-OX) tablet 400 mg, 400 mg, Oral, Daily  metoprolol succinate (TOPROL XL) extended release tablet 100 mg, 100 mg, Oral, QAM  mirabegron (MYRBETRIQ) extended release tablet 50 mg, 50 mg, Oral, Daily  potassium chloride (KLOR-CON M) extended release tablet 20 mEq, 20 mEq, Oral, BID WC  vitamin D3 (CHOLECALCIFEROL) tablet 400 Units, 400 Units, Oral, Daily  vitamin E capsule 400 Units, 400 Units, Oral, Daily  sodium chloride flush 0.9 % injection 10 mL, 10 mL, Intravenous, 2 times per day  sodium chloride flush 0.9 % injection 10 mL, 10 mL, Intravenous, PRN  acetaminophen (TYLENOL) tablet 650 mg, 650 mg, Oral, Q6H PRN **OR** acetaminophen (TYLENOL) suppository 650 mg, 650 mg, Rectal, Q6H PRN  polyethylene glycol (GLYCOLAX) packet 17 g, 17 g, Oral, Daily PRN  promethazine (PHENERGAN) tablet 12.5 mg, 12.5 mg, Oral, Q6H PRN **OR** ondansetron (ZOFRAN) injection 4 mg, 4 mg, Intravenous, Q6H PRN  enoxaparin (LOVENOX) injection 40 mg, 40 mg, Subcutaneous, Daily  insulin lispro (HUMALOG) injection vial 0-6 Units, 0-6 Units, Subcutaneous, TID   insulin lispro (HUMALOG) injection vial 0-3 Units, 0-3 Units, Subcutaneous, Nightly  glucose (GLUTOSE) 40 % oral gel 15 g, 15 g, Oral, PRN  dextrose 50 % IV solution, 12.5 g, Intravenous, PRN  glucagon (rDNA) injection 1 mg, 1 mg, Intramuscular, PRN  dextrose 5 % solution, 100 mL/hr, Intravenous, PRN  ipratropium-albuterol (DUONEB) nebulizer solution 1 ampule, 1 ampule, Inhalation, Q4H WA  doxycycline hyclate (VIBRAMYCIN) capsule 100 mg, 100 mg, Oral, 2 times per day  perflutren lipid microspheres (DEFINITY) injection 1.65 mg, 1.5 mL, Intravenous, ONCE PRN    Allergies:  Patient has no known allergies. Social History:    Lives at home with his wife and daughter in a ranch style home. extremity edema to above knees. No varicosities. ABDOMEN: Firm abdomen, pitting edema  MS: Good muscle strength and tone. No atrophy or abnormal movements. : Deferred   SKIN: Warm and dry, bilateral lower extremities erythematous  NEURO / PSYCH: Oriented to person, place and time. Speech clear and appropriate. Follows all commands. Pleasant affect     DATA:    ECG:as per Dr Jazmin Dee strips: Sinus rhythm heart rate 80-90    Diagnostic:      Intake/Output Summary (Last 24 hours) at 10/8/2020 1242  Last data filed at 10/8/2020 1028  Gross per 24 hour   Intake 480 ml   Output 375 ml   Net 105 ml       Labs:   CBC:   Recent Labs     10/07/20  1628   WBC 11.9*   HGB 11.9*   HCT 40.7        BMP:   Recent Labs     10/07/20  1628 10/07/20  1752 10/08/20  0513     --  143   K 4.2 3.8 3.6   CO2 32*  --  35*   BUN 16  --  14   CREATININE 1.3*  --  1.3*   LABGLOM 57  --  57   CALCIUM 8.5*  --  8.3*     HgA1c:   Lab Results   Component Value Date    LABA1C 7.7 05/22/2020     CARDIAC ENZYMES:  Recent Labs     10/07/20  1628 10/07/20  1752 10/08/20  1108   TROPONINI 0.13* 0.17* 0.15*     FASTING LIPID PANEL:  Lab Results   Component Value Date    CHOL 169 07/15/2019    HDL 40 07/15/2019    LDLCALC 90 07/15/2019    TRIG 193 07/15/2019     LIVER PROFILE:  Recent Labs     10/07/20  1628   AST 31   ALT 18   LABALBU 3.5       Assessment and Plan per Dr Aarti Sue   Electronically signed by [place], [place] on 10/8/2020 at 12:42 PM     ______________________________________________________________________  I independently interviewed and examined the patient. I have reviewed the above documentation completed by the JALEEL. Please see my additional contributions to the HPI, physical exam, and assessment / medical decision making.     HPI, ROS, PMH, PSH, FMH, SH, and medications independently reviewed (agree; see above documentation)    Review of Systems:  Cardiac: As per HPI  General: No fever, chills  Pulmonary: As per HPI  GI: No nausea, vomiting  Musculoskeletal: RAINES x 4, no focal motor deficits  Skin: Intact, no rashes  Neuro/Psych: No headache or seizures    Physical Exam:  BP (!) 157/73   Pulse 92   Temp 96.9 °F (36.1 °C) (Temporal)   Resp 18   Ht 6' 3\" (1.905 m)   Wt (!) 384 lb 7.7 oz (174.4 kg)   SpO2 90%   BMI 48.06 kg/m²   Appearance: Awake, alert, no acute respiratory distress  Skin: Intact, no rash  Head: Normocephalic, atraumatic  Neck: Supple, no carotid bruits  Lungs: Clear to auscultation bilaterally. No wheezes, rales, or rhonchi. Cardiac: Regular rate and rhythm, +S1S2, no murmurs apparent  Abdomen: Distended, +bowel sounds  Extremities: Bilateral lower extremity edema  Neurologic: No focal motor deficits apparent, normal mood and affect    Assessment/Plan:  62-year-old morbidly obese male with history of heart failure with preserved ejection fraction with stage III diastolic dysfunction on previous echocardiogram, hypertension, hypothyroidism, newly diagnosed diabetes mellitus on metformin who presents with shortness of breath and orthopnea found to have anasarca. He was started on diuresis. proBNP was elevated 2000. Troponin was elevated at 0.17. ECG shows no ischemic abnormalities. Recommendations  Continue aggressive diuresis furosemide 80 mg IV 3 times daily. Strict input output urine. Daily standing weight. With plan for ischemic evaluation next week with the left heart catheterization. Discontinue metoprolol. Start amlodipine. Continue losartan. Decrease hydralazine dosage.         Laura Day MD  [facility]) Cardiology

## 2020-10-08 NOTE — PLAN OF CARE
Problem:  Activity:  Goal: Capacity to carry out activities will improve  Description: Capacity to carry out activities will improve  Outcome: Met This Shift  Goal: Will verbalize the importance of balancing activity with adequate rest periods  Description: Will verbalize the importance of balancing activity with adequate rest periods  Outcome: Met This Shift     Problem: Cardiac:  Goal: Hemodynamic stability will improve  Description: Hemodynamic stability will improve  Outcome: Met This Shift  Goal: Ability to maintain an adequate cardiac output will improve  Description: Ability to maintain an adequate cardiac output will improve  Outcome: Met This Shift     Problem: Coping:  Goal: Verbalizations of decreased anxiety will decrease  Description: Verbalizations of decreased anxiety will decrease  Outcome: Met This Shift     Problem: Fluid Volume:  Goal: Risk for excess fluid volume will decrease  Description: Risk for excess fluid volume will decrease  Outcome: Met This Shift  Goal: Maintenance of adequate hydration will improve  Description: Maintenance of adequate hydration will improve  Outcome: Met This Shift  Goal: Will show no signs and symptoms of electrolyte imbalance  Description: Will show no signs and symptoms of electrolyte imbalance  Outcome: Met This Shift     Problem: Health Behavior:  Goal: Ability to manage health-related needs will improve  Description: Ability to manage health-related needs will improve  Outcome: Met This Shift  Goal: Ability to seek appropriate health care will improve  Description: Ability to seek appropriate health care will improve  Outcome: Met This Shift     Problem: Nutritional:  Goal: Maintenance of adequate nutrition will improve  Description: Maintenance of adequate nutrition will improve  Outcome: Met This Shift     Problem: Physical Regulation:  Goal: Complications related to the disease process, condition or treatment will be avoided or minimized  Description: Complications related to the disease process, condition or treatment will be avoided or minimized  Outcome: Met This Shift     Problem: Respiratory:  Goal: Ability to maintain adequate ventilation will improve  Description: Ability to maintain adequate ventilation will improve  Outcome: Met This Shift  Goal: Respiratory status will improve  Description: Respiratory status will improve  Outcome: Met This Shift

## 2020-10-08 NOTE — CONSULTS
Nancy Kern M.D.,[facility]  Dinorah Aparicio D.O., F.A.C.O.I., Mira Abel M.D. Amy Rehman M.D., Cristopher Muhammad M.D. Jeremiah Carranza D.O. Patient:  Brenna Cooley 64 y.o. male MRN: [id number]     Date of Service: 10/8/2020      PULMONARY CONSULTATION    Reason for Consultation: Hypoxia, bronchitis  Referring Physician: Dr. Darci Jacome with the referring physician will be sent via the electronic medical record. Chief Complaint: sob     CODE STATUS:full    SUBJECTIVE:  HPI:  Brenna Cooley is a 64 y.o. male who presented to the ED 1 day ago with complaint of fatigue, that began 7 days ago and is gradually worsened, relieved by nothing. It was significant past medical history of CHF, MARLEEN with central sleep apnea. 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 He denies fever, chills, nausea, vomiting, diarrhea. Ultrasound of lower extremities did not reveal DVT. Cardiology has been consulted for heart failure. Our service has been consulted for respiratory failure. History of MARLEEN followed with Dr. Gallito Miguel has not had follow-up. Patient seen in room sitting at side of bed he appears in no acute distress his wife is at bedside. He reports that 2 to 3 weeks ago he had cold-like symptoms his daughter had been sick with a cold developed a cough but also reports he has chronic cough he has had increased swelling of his lower extremities and has been adjusting his Lasix with his primary care doctor for diuresis. Swelling did not improve . Patient denies fever, chills, night sweats, diarrhea or vomiting. COVID 19 testing was negative . Patient reports he is never been a smoker. He does work heavy equipment and is exposed to dust and fumes. He admits to orthopnea sleeps in a chair, daytime sleepiness and wife reports loud snoring. Denies any history of asthma, COPD or other lung disease or disorders.     Past Medical History: Diagnosis Date    CHF (congestive heart failure) (HCC)     Hypertension     Hypothyroidism    MARLEEN     History reviewed. No pertinent surgical history. Family History   Problem Relation Age of Onset    Cancer Mother     Arthritis Mother     Diabetes Father     Stroke Father        Social History:   Social History     Socioeconomic History    Marital status:      Spouse name: Not on file    Number of children: Not on file    Years of education: Not on file    Highest education level: Not on file   Occupational History    Not on file   Social Needs    Financial resource strain: Not on file    Food insecurity     Worry: Not on file     Inability: Not on file   [employer] needs     Medical: Not on file     Non-medical: Not on file   Tobacco Use    Smoking status: Never Smoker    Smokeless tobacco: Never Used   Substance and Sexual Activity    Alcohol use: No     Alcohol/week: 0.0 standard drinks    Drug use: No    Sexual activity: Not on file   Lifestyle    Physical activity     Days per week: Not on file     Minutes per session: Not on file    Stress: Not on file   Relationships    Social connections     Talks on phone: Not on file     Gets together: Not on file     Attends Taoism service: Not on file     Active member of club or organization: Not on file     Attends meetings of clubs or organizations: Not on file     Relationship status: Not on file    Intimate partner violence     Fear of current or ex partner: Not on file     Emotionally abused: Not on file     Physically abused: Not on file     Forced sexual activity: Not on file   Other Topics Concern    Not on file   Social History Narrative    Not on file     Smoking history:   never  ETOH:   reports no history of alcohol use. Exposures: There  is not history of TB or TB exposure. There is not asbestos or silica dust exposure. The patient reports does not have coal, foundry, quarry or [employer] exposure. Recent travel history none. There is not  history of recreational or IV drug use. There is not hot tub exposure. The patient does not have any exotic pets, turtles or exotic birds. Vaccines:    Influenza: up to date  Pneumococcal Polysaccharide: There is no immunization history on file for this patient.      Home Meds: Medications Prior to Admission: potassium chloride (KLOR-CON M20) 20 MEQ extended release tablet, TAKE 1 TABLET BY MOUTH TWICE A DAY  metoprolol succinate (TOPROL XL) 100 MG extended release tablet, Take 1 tablet by mouth every morning  metoprolol succinate (TOPROL XL) 25 MG extended release tablet, Take one daily  atorvastatin (LIPITOR) 10 MG tablet, Take one by mouth on Mondays, Wednesdays, and Fridays  levothyroxine (SYNTHROID) 100 MCG tablet, Take 1 tablet by mouth Daily  hydrALAZINE (APRESOLINE) 50 MG tablet, Take 50 mg by mouth 3 times daily  mirabegron (MYRBETRIQ) 50 MG TB24, Take 50 mg by mouth daily  metFORMIN (GLUCOPHAGE) 500 MG tablet, Take 1 tablet by mouth 2 times daily (with meals)  furosemide (LASIX) 20 MG tablet, TAKE 1 TABLET BY MOUTH TWICE A DAY  vitamin D3 (CHOLECALCIFEROL) 400 units TABS tablet, Take 400 Units by mouth daily  vitamin E 400 UNIT capsule, Take 400 Units by mouth daily  magnesium oxide (MAG-OX) 400 MG tablet, TAKE 1 TABLET BY MOUTH DAILY  Multiple Vitamins-Minerals (THERAPEUTIC MULTIVITAMIN-MINERALS) tablet, Take 1 tablet by mouth daily  losartan (COZAAR) 100 MG tablet, Take 1 tablet by mouth daily  metFORMIN (GLUCOPHAGE) 1000 MG tablet, Take 1 tablet by mouth 2 times daily (with meals)    CURRENT MEDS :  Scheduled Meds:   metFORMIN  1,000 mg Oral BID WC    atorvastatin  10 mg Oral Daily    furosemide  20 mg Oral BID WC    hydrALAZINE  50 mg Oral TID    levothyroxine  100 mcg Oral Daily    losartan  100 mg Oral Daily    magnesium oxide  400 mg Oral Daily    metoprolol succinate  100 mg Oral QAM    mirabegron  50 mg Oral Daily    potassium chloride 20 mEq Oral BID     vitamin D3  400 Units Oral Daily    vitamin E  400 Units Oral Daily    sodium chloride flush  10 mL Intravenous 2 times per day    enoxaparin  40 mg Subcutaneous Daily    insulin lispro  0-6 Units Subcutaneous TID     insulin lispro  0-3 Units Subcutaneous Nightly    ipratropium-albuterol  1 ampule Inhalation Q4H WA    doxycycline hyclate  100 mg Oral 2 times per day       Continuous Infusions:   dextrose         No Known Allergies    REVIEW OF SYSTEMS:  Constitutional: Denies fever, weight loss, night sweats, and +fatigue   Skin: Denies pigmentation, dark lesions, and rashes   HEENT: Denies hearing loss, tinnitus, ear drainage, epistaxis, sore throat, and hoarseness. Cardiovascular: Denies palpitations, chest pain, and chest pressure. Respiratory:+cough, +dyspnea at rest with activity , denies hemoptysis, apnea, and choking. Gastrointestinal: Denies nausea, vomiting, poor appetite, diarrhea, heartburn or reflux  Genitourinary: Denies dysuria, frequency, urgency or hematuria  Musculoskeletal: Denies myalgias, muscle weakness, and bone pain  Neurological: Denies dizziness, vertigo, headache, and focal weakness  Psychological: Denies anxiety and depression  Endocrine: Denies heat intolerance and cold intolerance  Hematopoietic/Lymphatic: Denies bleeding problems and blood transfusions    OBJECTIVE:   BP (!) 177/84   Pulse 90   Temp 98.1 °F (36.7 °C) (Temporal)   Resp 20   Ht 6' 3\" (1.905 m)   Wt (!) 384 lb 7.7 oz (174.4 kg)   SpO2 92%   BMI 48.06 kg/m²   SpO2 Readings from Last 1 Encounters:   10/08/20 92%        I/O:  No intake or output data in the 24 hours ending 10/08/20 0913  Vent Information  SpO2: 92 %                Physical Exam:  General: The patient is lying in bed comfortably without any distress.   Breathing is not labored morbid obesity  HEENT: Pupils are equal round and reactive to light, there are no oral lesions and no post-nasal drip   Neck: supple without adenopathy  Cardiovascular: regular rate and rhythm without murmur or gallop  Respiratory: Clear to auscultation bilaterally without wheezing or crackles, DM bases . Air entry is symmetric  Abdomen: soft, non-tender, non-distended, normal bowel sounds  Extremities: warm,+b/l lower  edema, no clubbing  Skin: no rash or lesion  Neurologic: CN II-XII grossly intact, no focal deficits    Pulmonary Function Testing personally reviewed and interpreted  none    Imaging personally reviewed:  EXAMINATION:    TWO XRAY VIEWS OF THE CHEST         10/7/2020 2:25 pm         COMPARISON:    February 21, 2016         HISTORY:    ORDERING SYSTEM PROVIDED HISTORY: chest pain    TECHNOLOGIST PROVIDED HISTORY:    Reason for exam:->chest pain    What reading provider will be dictating this exam?->CRC         FINDINGS:    There is interstitial prominence in perihilar locations.  There is mild    prominence of pulmonary vasculature.  The heart is normal in size.  No focal    airspace opacity or pleural effusion.              Impression    1.  Interstitial prominence in perihilar locations could suggest    peribronchial inflammatory changes or mild pulmonary vascular congestion.         2.  No focal airspace opacity or pleural effusion. Echo:  2/19/2016  Inferior Vena Cava      Conclusions      Summary   Left ventricle not well visualized   Contrast used   Normal left ventricle size and systolic function   Moderate concentric left ventricular hypertrophy   Moderately dilated left atrium   Moderately dilated right ventricle   Borderline reduced right ventricle systolic function   Markedly enlarged right atrium size. Mild mitral regurgitation   Moderate tricuspid regurgitation. RVSP is 55 mmHg.    Stage III diastolic dysfunction      Signature         Labs:  Lab Results   Component Value Date    WBC 11.9 10/07/2020    HGB 11.9 10/07/2020    HCT 40.7 10/07/2020    MCV 88.7 10/07/2020    MCH 25.9 10/07/2020    MCHC 29.2 10/07/2020

## 2020-10-08 NOTE — H&P
Mandy Currie is a 64 y.o. male  Chief Complaint   Patient presents with    Fatigue     increased fatigue x5 days, cough/congestion/SOB, BLE swelling      HPI  As above, pt states for several months he has no felt right. He states he has been tired, but he also has a new job where he cannot put his feet down. He states yesterday he felt sob and had gained weight in his legs. He denies any cp, n/v/d/c, fever/chills or sweats. No current facility-administered medications on file prior to encounter.       Current Outpatient Medications on File Prior to Encounter   Medication Sig Dispense Refill    potassium chloride (KLOR-CON M20) 20 MEQ extended release tablet TAKE 1 TABLET BY MOUTH TWICE A  tablet 0    metoprolol succinate (TOPROL XL) 100 MG extended release tablet Take 1 tablet by mouth every morning 90 tablet 0    metoprolol succinate (TOPROL XL) 25 MG extended release tablet Take one daily 90 tablet 0    atorvastatin (LIPITOR) 10 MG tablet Take one by mouth on Mondays, Wednesdays, and Fridays 36 tablet 0    levothyroxine (SYNTHROID) 100 MCG tablet Take 1 tablet by mouth Daily 90 tablet 0    hydrALAZINE (APRESOLINE) 50 MG tablet Take 50 mg by mouth 3 times daily      mirabegron (MYRBETRIQ) 50 MG TB24 Take 50 mg by mouth daily      metFORMIN (GLUCOPHAGE) 500 MG tablet Take 1 tablet by mouth 2 times daily (with meals) 180 tablet 1    furosemide (LASIX) 20 MG tablet TAKE 1 TABLET BY MOUTH TWICE A  tablet 0    vitamin D3 (CHOLECALCIFEROL) 400 units TABS tablet Take 400 Units by mouth daily      vitamin E 400 UNIT capsule Take 400 Units by mouth daily      magnesium oxide (MAG-OX) 400 MG tablet TAKE 1 TABLET BY MOUTH DAILY 90 tablet 1    Multiple Vitamins-Minerals (THERAPEUTIC MULTIVITAMIN-MINERALS) tablet Take 1 tablet by mouth daily      losartan (COZAAR) 100 MG tablet Take 1 tablet by mouth daily 90 tablet 0    metFORMIN (GLUCOPHAGE) 1000 MG tablet Take 1 tablet by mouth 2 times daily (with meals) 180 tablet 1     Past Medical History:   Diagnosis Date    CHF [facility]congestive heart failure) ([facility] [address]     Hypertension     Hypothyroidism      No Known Allergies  History reviewed. No pertinent surgical history.   Social History     Socioeconomic History    Marital status:      Spouse name: Not on file    Number of children: Not on file    Years of education: Not on file    Highest education level: Not on file   Occupational History    Not on file   Social Needs    Financial resource strain: Not on file    Food insecurity     Worry: Not on file     Inability: Not on file    Transportation needs     Medical: Not on file     Non-medical: Not on file   Tobacco Use    Smoking status: Never Smoker    Smokeless tobacco: Never Used   Substance and Sexual Activity    Alcohol use: No     Alcohol/week: 0.0 standard drinks    Drug use: No    Sexual activity: Not on file   Lifestyle    Physical activity     Days per week: Not on file     Minutes per session: Not on file    Stress: Not on file   Relationships    Social connections     Talks on phone: Not on file     Gets together: Not on file     Attends Episcopalian service: Not on file     Active member of club or organization: Not on file     Attends meetings of clubs or organizations: Not on file     Relationship status: Not on file    Intimate partner violence     Fear of current or ex partner: Not on file     Emotionally abused: Not on file     Physically abused: Not on file     Forced sexual activity: Not on file   Other Topics Concern    Not on file   Social History Narrative    Not on file     Family History   Problem Relation Age of Onset    Cancer Mother     Arthritis Mother     Diabetes Father     Stroke Father        ROS  Patient positive for  Edema, sob  Patient denies any fever, chills, night sweats, weight changes   Denies headache, visual changes,   Denies dysphagia, odynophagia dysphonia,   Denies  cough, sputum production, Denies chest pain, pressure, orthopnea, palpitations,   Denies abd pain, N/V/D/C/melena, hematochezia,   Denies urinary frequency, urgency, dysuria, hematuria,   Denies any acute muscle aches, paresthesias, weakness, seizure, syncopal episodes, Denies depression, anxiety. OBJECTIVE  Vitals:    10/07/20 2255   BP: (!) 143/84   Pulse: 84   Resp: 20   Temp: 98.1 °F (36.7 °C)   SpO2: 93%     Gen: AO3, NAD  Head: AT/NC, PERRL, EOMIx2, no icterus, conjunctival injection  Neck: No JVD, carotid bruits, LAD, thyroid non-palpable  Heart: RRR with no murmurs, rubs, gallops  Lungs: CTA B/L, no W/R/R  Abd: soft, NT, ND, BS+, no G/R, no HSM  Ext: No C/C +edema lower  pulses intact distally B/L  Neuro: CN 2-12 grossly intact with no focal deficits  Lab Results   Component Value Date    WBC 11.9 (H) 10/07/2020    HGB 11.9 (L) 10/07/2020    HCT 40.7 10/07/2020    MCV 88.7 10/07/2020     10/07/2020     Lab Results   Component Value Date     10/08/2020    K 3.6 10/08/2020    K 4.2 10/07/2020     10/08/2020    CO2 35 10/08/2020    BUN 14 10/08/2020    CREATININE 1.3 10/08/2020    GLUCOSE 165 10/08/2020    CALCIUM 8.3 10/08/2020        ASSESSMENT  1. Localized edema due to fluid overload    2. Congestive heart failure, unspecified HF chronicity, unspecified heart failure type ([facility] [address]    3. Hypoxia    4.  DM2  PLAN    I will consult Cardiology, he states he is already feeling better  He may need Pulmonology also, but he is already asking to go home

## 2020-10-09 LAB
ANION GAP SERPL CALCULATED.3IONS-SCNC: 10 MMOL/L (ref 7–16)
BASOPHILS ABSOLUTE: 0 E9/L (ref 0–0.2)
BASOPHILS RELATIVE PERCENT: 0.4 % (ref 0–2)
BUN BLDV-MCNC: 15 MG/DL (ref 6–20)
CALCIUM SERPL-MCNC: 8.6 MG/DL (ref 8.6–10.2)
CHLORIDE BLD-SCNC: 94 MMOL/L (ref 98–107)
CO2: 39 MMOL/L (ref 22–29)
CREAT SERPL-MCNC: 1.3 MG/DL (ref 0.7–1.2)
EOSINOPHILS ABSOLUTE: 0.19 E9/L (ref 0.05–0.5)
EOSINOPHILS RELATIVE PERCENT: 1.8 % (ref 0–6)
GFR AFRICAN AMERICAN: >60
GFR NON-AFRICAN AMERICAN: 57 ML/MIN/1.73
GLUCOSE BLD-MCNC: 166 MG/DL (ref 74–99)
HCT VFR BLD CALC: 43 % (ref 37–54)
HEMOGLOBIN: 12.2 G/DL (ref 12.5–16.5)
LYMPHOCYTES ABSOLUTE: 0.42 E9/L (ref 1.5–4)
LYMPHOCYTES RELATIVE PERCENT: 3.5 % (ref 20–42)
MCH RBC QN AUTO: 25.9 PG (ref 26–35)
MCHC RBC AUTO-ENTMCNC: 28.4 % (ref 32–34.5)
MCV RBC AUTO: 91.3 FL (ref 80–99.9)
METER GLUCOSE: 157 MG/DL (ref 74–99)
METER GLUCOSE: 163 MG/DL (ref 74–99)
METER GLUCOSE: 184 MG/DL (ref 74–99)
METER GLUCOSE: 191 MG/DL (ref 74–99)
MONOCYTES ABSOLUTE: 0.64 E9/L (ref 0.1–0.95)
MONOCYTES RELATIVE PERCENT: 6.1 % (ref 2–12)
NEUTROPHILS ABSOLUTE: 9.43 E9/L (ref 1.8–7.3)
NEUTROPHILS RELATIVE PERCENT: 88.6 % (ref 43–80)
OVALOCYTES: ABNORMAL
PDW BLD-RTO: 16.3 FL (ref 11.5–15)
PLATELET # BLD: 292 E9/L (ref 130–450)
PMV BLD AUTO: 9.8 FL (ref 7–12)
POIKILOCYTES: ABNORMAL
POLYCHROMASIA: ABNORMAL
POTASSIUM SERPL-SCNC: 3.6 MMOL/L (ref 3.5–5)
RBC # BLD: 4.71 E12/L (ref 3.8–5.8)
SODIUM BLD-SCNC: 143 MMOL/L (ref 132–146)
TEAR DROP CELLS: ABNORMAL
WBC # BLD: 10.6 E9/L (ref 4.5–11.5)

## 2020-10-09 PROCEDURE — 36415 COLL VENOUS BLD VENIPUNCTURE: CPT

## 2020-10-09 PROCEDURE — 2060000000 HC ICU INTERMEDIATE R&B

## 2020-10-09 PROCEDURE — 6370000000 HC RX 637 (ALT 250 FOR IP): Performed by: INTERNAL MEDICINE

## 2020-10-09 PROCEDURE — 94640 AIRWAY INHALATION TREATMENT: CPT

## 2020-10-09 PROCEDURE — 6370000000 HC RX 637 (ALT 250 FOR IP): Performed by: STUDENT IN AN ORGANIZED HEALTH CARE EDUCATION/TRAINING PROGRAM

## 2020-10-09 PROCEDURE — 80048 BASIC METABOLIC PNL TOTAL CA: CPT

## 2020-10-09 PROCEDURE — 82962 GLUCOSE BLOOD TEST: CPT

## 2020-10-09 PROCEDURE — 85025 COMPLETE CBC W/AUTO DIFF WBC: CPT

## 2020-10-09 PROCEDURE — 2580000003 HC RX 258: Performed by: INTERNAL MEDICINE

## 2020-10-09 PROCEDURE — 6360000002 HC RX W HCPCS: Performed by: INTERNAL MEDICINE

## 2020-10-09 PROCEDURE — 2700000000 HC OXYGEN THERAPY PER DAY

## 2020-10-09 PROCEDURE — 6360000002 HC RX W HCPCS: Performed by: STUDENT IN AN ORGANIZED HEALTH CARE EDUCATION/TRAINING PROGRAM

## 2020-10-09 RX ORDER — SPIRONOLACTONE 25 MG/1
25 TABLET ORAL DAILY
Status: DISCONTINUED | OUTPATIENT
Start: 2020-10-09 | End: 2020-10-12 | Stop reason: HOSPADM

## 2020-10-09 RX ADMIN — Medication 400 UNITS: at 09:43

## 2020-10-09 RX ADMIN — LOSARTAN POTASSIUM 100 MG: 50 TABLET, FILM COATED ORAL at 09:43

## 2020-10-09 RX ADMIN — POTASSIUM CHLORIDE 20 MEQ: 1500 TABLET, EXTENDED RELEASE ORAL at 09:44

## 2020-10-09 RX ADMIN — AMLODIPINE BESYLATE 10 MG: 10 TABLET ORAL at 09:44

## 2020-10-09 RX ADMIN — SPIRONOLACTONE 25 MG: 25 TABLET ORAL at 09:43

## 2020-10-09 RX ADMIN — DOXYCYCLINE HYCLATE 100 MG: 100 CAPSULE ORAL at 09:44

## 2020-10-09 RX ADMIN — LEVOTHYROXINE SODIUM 100 MCG: 0.1 TABLET ORAL at 05:51

## 2020-10-09 RX ADMIN — SODIUM CHLORIDE, PRESERVATIVE FREE 10 ML: 5 INJECTION INTRAVENOUS at 09:45

## 2020-10-09 RX ADMIN — DOXYCYCLINE HYCLATE 100 MG: 100 CAPSULE ORAL at 21:08

## 2020-10-09 RX ADMIN — FUROSEMIDE 80 MG: 10 INJECTION, SOLUTION INTRAVENOUS at 21:07

## 2020-10-09 RX ADMIN — FUROSEMIDE 80 MG: 10 INJECTION, SOLUTION INTRAVENOUS at 14:21

## 2020-10-09 RX ADMIN — MAGNESIUM GLUCONATE 500 MG ORAL TABLET 400 MG: 500 TABLET ORAL at 09:43

## 2020-10-09 RX ADMIN — FUROSEMIDE 80 MG: 10 INJECTION, SOLUTION INTRAVENOUS at 09:44

## 2020-10-09 RX ADMIN — POTASSIUM CHLORIDE 20 MEQ: 1500 TABLET, EXTENDED RELEASE ORAL at 18:25

## 2020-10-09 RX ADMIN — ENOXAPARIN SODIUM 40 MG: 40 INJECTION SUBCUTANEOUS at 09:44

## 2020-10-09 RX ADMIN — IPRATROPIUM BROMIDE AND ALBUTEROL SULFATE 1 AMPULE: 2.5; .5 SOLUTION RESPIRATORY (INHALATION) at 16:19

## 2020-10-09 RX ADMIN — IPRATROPIUM BROMIDE AND ALBUTEROL SULFATE 1 AMPULE: 2.5; .5 SOLUTION RESPIRATORY (INHALATION) at 11:35

## 2020-10-09 RX ADMIN — CHOLECALCIFEROL TAB 10 MCG (400 UNIT) 400 UNITS: 10 TAB at 09:44

## 2020-10-09 RX ADMIN — METFORMIN HYDROCHLORIDE 1000 MG: 1000 TABLET ORAL at 18:25

## 2020-10-09 RX ADMIN — SODIUM CHLORIDE, PRESERVATIVE FREE 10 ML: 5 INJECTION INTRAVENOUS at 21:08

## 2020-10-09 RX ADMIN — METFORMIN HYDROCHLORIDE 1000 MG: 1000 TABLET ORAL at 09:44

## 2020-10-09 RX ADMIN — ATORVASTATIN CALCIUM 10 MG: 10 TABLET, FILM COATED ORAL at 09:43

## 2020-10-09 ASSESSMENT — PAIN SCALES - GENERAL
PAINLEVEL_OUTOF10: 0

## 2020-10-09 NOTE — PROGRESS NOTES
Rebeca Collins M.D.,[facility]  Gonzalez Baron D.O., FMARQUEZ., Roseann Russell M.D. Glenda Hoffman M.D., Margaux Henry M.D. Arslan Vasquez D.O. Daily Pulmonary Progress Note    Patient:  Leticia Dominguez 64 y.o. male MRN: [id number]     Date of Service: 10/9/2020      Synopsis     We are following patient for resp failure, MARLEEN    \"CC\" sob     Code status:full      Subjective      Patient was seen and examined. laying in bed in NAD , no ,labored breathing. He reports he will have a heart cath for next week . Still on 02 , wean as tolerated. Continues to diurese. Review of Systems:  Constitutional: Denies fever, weight loss, night sweats, and fatigue  Skin: Denies pigmentation, dark lesions, and rashes   HEENT: Denies hearing loss, tinnitus, ear drainage, epistaxis, sore throat, and hoarseness. Cardiovascular: Denies palpitations, chest pain, and chest pressure. Respiratory: Denies cough, dyspnea at rest, hemoptysis, apnea, and choking.   Gastrointestinal: Denies nausea, vomiting, poor appetite, diarrhea, heartburn or reflux  Genitourinary: Denies dysuria, frequency, urgency or hematuria    24-hour events:  none    Objective   Vitals: /66   Pulse 100   Temp 98.9 °F (37.2 °C) (Temporal)   Resp 18   Ht 6' 3\" (1.905 m)   Wt (!) 382 lb 12.8 oz (173.6 kg)   SpO2 94%   BMI 47.85 kg/m²     I/O:    Intake/Output Summary (Last 24 hours) at 10/9/2020 1427  Last data filed at 10/9/2020 0957  Gross per 24 hour   Intake 120 ml   Output 3075 ml   Net -2955 ml       Vent Information  SpO2: 94 %                CURRENT MEDS :  Scheduled Meds:   spironolactone  25 mg Oral Daily    metFORMIN  1,000 mg Oral BID WC    atorvastatin  10 mg Oral Daily    levothyroxine  100 mcg Oral Daily    losartan  100 mg Oral Daily    magnesium oxide  400 mg Oral Daily    mirabegron  50 mg Oral Daily    potassium chloride  20 mEq Oral BID WC    vitamin D3  400 Units Oral Daily    vitamin E  400 Units Oral Daily    sodium chloride flush  10 mL Intravenous 2 times per day    enoxaparin  40 mg Subcutaneous Daily    insulin lispro  0-6 Units Subcutaneous TID WC    insulin lispro  0-3 Units Subcutaneous Nightly    ipratropium-albuterol  1 ampule Inhalation Q4H WA    doxycycline hyclate  100 mg Oral 2 times per day    amLODIPine  10 mg Oral Daily    furosemide  80 mg Intravenous TID       Physical Exam:  General Appearance: appears comfortable in no acute distress. HEENT: Normocephalic atraumatic without obvious abnormality   Neck: Lips, mucosa, and tongue normal.  Supple, symmetrical, trachea midline, no adenopathy;thyroid:  no enlargement/tenderness/nodules or JVD. Lung: Breath sounds CTA. Respirations   unlabored. Symmetrical expansion. Heart: RRR, normal S1, S2. No MRG  Abdomen: Soft, NT, ND. BS present x 4 quadrants. No bruit or organomegaly. Extremities: Pedal pulses 2+ symmetric b/l. Extremities normal, no cyanosis, clubbing, or edema. Musculokeletal: No joint swelling, no muscle tenderness. ROM normal in all joints of extremities. Neurologic: Mental status: Alert and Oriented X3 .     Pertinent/ New Labs and Imaging Studies     Imaging Personally Reviewed:            Labs:  Lab Results   Component Value Date    WBC 10.6 10/09/2020    HGB 12.2 10/09/2020    HCT 43.0 10/09/2020    MCV 91.3 10/09/2020    MCH 25.9 10/09/2020    MCHC 28.4 10/09/2020    RDW 16.3 10/09/2020     10/09/2020    MPV 9.8 10/09/2020     Lab Results   Component Value Date     10/09/2020    K 3.6 10/09/2020    K 4.2 10/07/2020    CL 94 10/09/2020    CO2 39 10/09/2020    BUN 15 10/09/2020    CREATININE 1.3 10/09/2020    LABALBU 3.5 10/07/2020    CALCIUM 8.6 10/09/2020    GFRAA >60 10/09/2020    LABGLOM 57 10/09/2020     Lab Results   Component Value Date    PROTIME 13.8 02/21/2016    INR 1.2 02/21/2016     Recent Labs     10/07/20  1628   PROBNP 2,277*     No results for input(s): PROCAL in the last 72 hours.  This SmartLink has not been configured with any valid records. Micro:  No results for input(s): CULTRESP in the last 72 hours. No results for input(s): LABGRAM in the last 72 hours. No results for input(s): LEGUR in the last 72 hours. No results for input(s): STREPNEUMAGU in the last 72 hours. No results for input(s): LP1UAG in the last 72 hours. Assessment:    1. Acute respiratory failure with hypoxia  2. Acute on chronic decompensated congestive heart failure  3. Leukocytosis  4. Hx of MARLEEN   5. Hx of 2016 thoracentesis       Plan:   1. Cardiology following -cardiac cath Monday , diuresis per cardiology   2. OP PSG split study after d/c   3. Keep pulseoximetry greater than 92% , currently on 3 l wean as tolerated   4. Strict I&O -2,970 last 24 hrs     This plan of care was reviewed in collaboration with Dr. Joaquin Iqbal   Electronically signed by BRITTNEY Starks on 10/9/2020 at 2:27 PM        I personally saw, examined, and cared for the patient. Labs, medications, radiographs reviewed. I agree with history exam and plans detailed in NP note.       Electronically signed by Kelly Zeng DO on 10/9/2020 at 6:06 PM

## 2020-10-09 NOTE — PROGRESS NOTES
Subjective: The patient is awake and alert. No problems overnight. Denies chest pain, angina, and dyspnea. Denies abdominal pain. Tolerating diet. No nausea or vomiting. He states he is feeling much better. Objective:  Pt is aox3 in nad   BP (!) 156/88   Pulse 96   Temp 97.8 °F (36.6 °C) (Temporal)   Resp 18   Ht 6' 3\" (1.905 m)   Wt (!) 382 lb 12.8 oz (173.6 kg)   SpO2 92%   BMI 47.85 kg/m²   HEENT no adenopathy no bruits  Heart:  RRR, no murmurs, gallops, or rubs. Lungs:  Scattered rhonchi   Abd: bowel sounds present, nontender, nondistended, no masses  Extrem:  No clubbing, cyanosis, +Edema lower   WBC/Hgb/Hct/Plts:  10.6/12.2/43.0/292 (10/09 8359) basic metabolic panel   Lab Results   Component Value Date     10/09/2020    K 3.6 10/09/2020    K 4.2 10/07/2020    CL 94 10/09/2020    CO2 39 10/09/2020    BUN 15 10/09/2020    CREATININE 1.3 10/09/2020    GLUCOSE 166 10/09/2020    CALCIUM 8.6 10/09/2020        Assessment:    Patient Active Problem List   Diagnosis    Diastolic dysfunction    Accelerated hypertension    CRI (chronic renal insufficiency)    Acquired hypothyroidism    Type 2 diabetes mellitus without complication, without long-term current use of insulin ([facility])    Class 3 severe obesity due to excess calories without serious comorbidity with body mass index (BMI) of 45.0 to 49.9 in adult [facility])    Essential hypertension    Hyperglycemia    Localized edema due to fluid overload    Fluid overload    Congestive heart failure ([facility] [address]       Plan:   For cardiac cath next week  Cont diuresing  Monitor labs        Uche Carmichael  7:40 AM  10/9/2020

## 2020-10-09 NOTE — CARE COORDINATION
Transition of care : The patient was admitted due to Increased fatigue and lower leg swelling and sob so cardiology and pulmonology was consulted due to chf. I met with the patient and his wife and the patient lives in a ranch home with 2 steps to get into it. For Dme he has home o2 at home from [facility] but he hasent had to use it recently. His pharmacy is [facility] in [facility] . Plan is home and his wife will be the person to pick him up. Plan home no needs.  I will follow

## 2020-10-10 LAB
ANION GAP SERPL CALCULATED.3IONS-SCNC: 6 MMOL/L (ref 7–16)
BUN BLDV-MCNC: 15 MG/DL (ref 6–20)
CALCIUM SERPL-MCNC: 8.4 MG/DL (ref 8.6–10.2)
CHLORIDE BLD-SCNC: 90 MMOL/L (ref 98–107)
CO2: 43 MMOL/L (ref 22–29)
CREAT SERPL-MCNC: 1.4 MG/DL (ref 0.7–1.2)
GFR AFRICAN AMERICAN: >60
GFR NON-AFRICAN AMERICAN: 52 ML/MIN/1.73
GLUCOSE BLD-MCNC: 133 MG/DL (ref 74–99)
HCT VFR BLD CALC: 37.1 % (ref 37–54)
HEMOGLOBIN: 10.8 G/DL (ref 12.5–16.5)
LV EF: 63 %
LVEF MODALITY: NORMAL
MCH RBC QN AUTO: 26 PG (ref 26–35)
MCHC RBC AUTO-ENTMCNC: 29.1 % (ref 32–34.5)
MCV RBC AUTO: 89.4 FL (ref 80–99.9)
METER GLUCOSE: 149 MG/DL (ref 74–99)
METER GLUCOSE: 164 MG/DL (ref 74–99)
METER GLUCOSE: 171 MG/DL (ref 74–99)
METER GLUCOSE: 200 MG/DL (ref 74–99)
PDW BLD-RTO: 16.2 FL (ref 11.5–15)
PLATELET # BLD: 240 E9/L (ref 130–450)
PMV BLD AUTO: 9.7 FL (ref 7–12)
POTASSIUM SERPL-SCNC: 3.5 MMOL/L (ref 3.5–5)
RBC # BLD: 4.15 E12/L (ref 3.8–5.8)
SODIUM BLD-SCNC: 139 MMOL/L (ref 132–146)
WBC # BLD: 10.4 E9/L (ref 4.5–11.5)

## 2020-10-10 PROCEDURE — 99233 SBSQ HOSP IP/OBS HIGH 50: CPT | Performed by: INTERNAL MEDICINE

## 2020-10-10 PROCEDURE — 2060000000 HC ICU INTERMEDIATE R&B

## 2020-10-10 PROCEDURE — 6370000000 HC RX 637 (ALT 250 FOR IP): Performed by: STUDENT IN AN ORGANIZED HEALTH CARE EDUCATION/TRAINING PROGRAM

## 2020-10-10 PROCEDURE — 93306 TTE W/DOPPLER COMPLETE: CPT

## 2020-10-10 PROCEDURE — 82962 GLUCOSE BLOOD TEST: CPT

## 2020-10-10 PROCEDURE — 2580000003 HC RX 258: Performed by: INTERNAL MEDICINE

## 2020-10-10 PROCEDURE — 6370000000 HC RX 637 (ALT 250 FOR IP): Performed by: INTERNAL MEDICINE

## 2020-10-10 PROCEDURE — 36415 COLL VENOUS BLD VENIPUNCTURE: CPT

## 2020-10-10 PROCEDURE — 85027 COMPLETE CBC AUTOMATED: CPT

## 2020-10-10 PROCEDURE — 2700000000 HC OXYGEN THERAPY PER DAY

## 2020-10-10 PROCEDURE — 6360000002 HC RX W HCPCS: Performed by: INTERNAL MEDICINE

## 2020-10-10 PROCEDURE — 94640 AIRWAY INHALATION TREATMENT: CPT

## 2020-10-10 PROCEDURE — 6360000004 HC RX CONTRAST MEDICATION: Performed by: INTERNAL MEDICINE

## 2020-10-10 PROCEDURE — 80048 BASIC METABOLIC PNL TOTAL CA: CPT

## 2020-10-10 RX ORDER — FUROSEMIDE 10 MG/ML
80 INJECTION INTRAMUSCULAR; INTRAVENOUS 2 TIMES DAILY
Status: DISCONTINUED | OUTPATIENT
Start: 2020-10-10 | End: 2020-10-11

## 2020-10-10 RX ADMIN — PERFLUTREN 0.32 MG: 6.52 INJECTION, SUSPENSION INTRAVENOUS at 14:48

## 2020-10-10 RX ADMIN — INSULIN LISPRO 1 UNITS: 100 INJECTION, SOLUTION INTRAVENOUS; SUBCUTANEOUS at 12:06

## 2020-10-10 RX ADMIN — LOSARTAN POTASSIUM 100 MG: 50 TABLET, FILM COATED ORAL at 09:11

## 2020-10-10 RX ADMIN — LEVOTHYROXINE SODIUM 100 MCG: 0.1 TABLET ORAL at 07:34

## 2020-10-10 RX ADMIN — INSULIN LISPRO 2 UNITS: 100 INJECTION, SOLUTION INTRAVENOUS; SUBCUTANEOUS at 17:32

## 2020-10-10 RX ADMIN — FUROSEMIDE 80 MG: 10 INJECTION, SOLUTION INTRAVENOUS at 18:17

## 2020-10-10 RX ADMIN — DOXYCYCLINE HYCLATE 100 MG: 100 CAPSULE ORAL at 09:10

## 2020-10-10 RX ADMIN — IPRATROPIUM BROMIDE AND ALBUTEROL SULFATE 1 AMPULE: 2.5; .5 SOLUTION RESPIRATORY (INHALATION) at 13:59

## 2020-10-10 RX ADMIN — DOXYCYCLINE HYCLATE 100 MG: 100 CAPSULE ORAL at 20:52

## 2020-10-10 RX ADMIN — POTASSIUM CHLORIDE 20 MEQ: 1500 TABLET, EXTENDED RELEASE ORAL at 17:31

## 2020-10-10 RX ADMIN — SPIRONOLACTONE 25 MG: 25 TABLET ORAL at 09:11

## 2020-10-10 RX ADMIN — Medication 400 UNITS: at 09:18

## 2020-10-10 RX ADMIN — METFORMIN HYDROCHLORIDE 1000 MG: 1000 TABLET ORAL at 07:34

## 2020-10-10 RX ADMIN — SODIUM CHLORIDE, PRESERVATIVE FREE 10 ML: 5 INJECTION INTRAVENOUS at 12:05

## 2020-10-10 RX ADMIN — METFORMIN HYDROCHLORIDE 1000 MG: 1000 TABLET ORAL at 17:31

## 2020-10-10 RX ADMIN — ATORVASTATIN CALCIUM 10 MG: 10 TABLET, FILM COATED ORAL at 09:10

## 2020-10-10 RX ADMIN — AMLODIPINE BESYLATE 10 MG: 10 TABLET ORAL at 09:10

## 2020-10-10 RX ADMIN — CHOLECALCIFEROL TAB 10 MCG (400 UNIT) 400 UNITS: 10 TAB at 09:10

## 2020-10-10 RX ADMIN — IPRATROPIUM BROMIDE AND ALBUTEROL SULFATE 1 AMPULE: 2.5; .5 SOLUTION RESPIRATORY (INHALATION) at 09:33

## 2020-10-10 RX ADMIN — MAGNESIUM GLUCONATE 500 MG ORAL TABLET 400 MG: 500 TABLET ORAL at 09:10

## 2020-10-10 RX ADMIN — INSULIN LISPRO 1 UNITS: 100 INJECTION, SOLUTION INTRAVENOUS; SUBCUTANEOUS at 20:53

## 2020-10-10 RX ADMIN — ENOXAPARIN SODIUM 40 MG: 40 INJECTION SUBCUTANEOUS at 09:11

## 2020-10-10 RX ADMIN — SODIUM CHLORIDE, PRESERVATIVE FREE 10 ML: 5 INJECTION INTRAVENOUS at 20:52

## 2020-10-10 RX ADMIN — POTASSIUM CHLORIDE 20 MEQ: 1500 TABLET, EXTENDED RELEASE ORAL at 07:35

## 2020-10-10 RX ADMIN — FUROSEMIDE 80 MG: 10 INJECTION, SOLUTION INTRAVENOUS at 12:05

## 2020-10-10 ASSESSMENT — PAIN SCALES - GENERAL
PAINLEVEL_OUTOF10: 0

## 2020-10-10 NOTE — PROGRESS NOTES
PT SEEN AND EXAMINED. Chart reviewed. meds reviewed. D/w nursing + family as available. EXAM: IN GENERAL, NAD. AWAKE AND ALERT. ROS NEGx10 EXCEPT: patient refuses CPAP. He gets belligerent when discussing MARLEEN  /63   Pulse 91   Temp 97.3 °F (36.3 °C) (Temporal)   Resp 18   Ht 6' 3\" (1.905 m)   Wt (!) 358 lb 9.6 oz (162.7 kg)   SpO2 93%   BMI 44.82 kg/m²   GEN: A+O NAD. HEENT: NCAT. EOMI. ZENIA  NECK: NO JVD. TRACH MIDLINE. NO BRUITS. NO THYROMEGALY. LUNGS: CTA BL NO RALES, RHONCHI OR WHEEZES. GOOD EXCURSION. CV: Regular rate and rhythm, NO Murmurs, Rubs, Or gallops  ABD: Soft. Nontender. Normal bowel sounds. No organomegaly  EXT:No clubbing cyanosis or edema  Neuro: Alert and oriented x 3. No focal motor deficits. No sensory deficits. Reflexes appear intact.   Labs/data reviewedLABS: CBC with Differential:    Lab Results   Component Value Date    WBC 10.4 10/10/2020    RBC 4.15 10/10/2020    HGB 10.8 10/10/2020    HCT 37.1 10/10/2020     10/10/2020    MCV 89.4 10/10/2020    MCH 26.0 10/10/2020    MCHC 29.1 10/10/2020    RDW 16.2 10/10/2020    LYMPHOPCT 3.5 10/09/2020    MONOPCT 6.1 10/09/2020    BASOPCT 0.4 10/09/2020    MONOSABS 0.64 10/09/2020    LYMPHSABS 0.42 10/09/2020    EOSABS 0.19 10/09/2020    BASOSABS 0.00 10/09/2020     Platelets:    Lab Results   Component Value Date     10/10/2020     CMP:    Lab Results   Component Value Date     10/10/2020    K 3.5 10/10/2020    K 4.2 10/07/2020    CL 90 10/10/2020    CO2 43 10/10/2020    BUN 15 10/10/2020    CREATININE 1.4 10/10/2020    GFRAA >60 10/10/2020    LABGLOM 52 10/10/2020    GLUCOSE 133 10/10/2020    PROT 7.1 10/07/2020    LABALBU 3.5 10/07/2020    CALCIUM 8.4 10/10/2020    BILITOT 0.4 10/07/2020    ALKPHOS 74 10/07/2020    AST 31 10/07/2020    ALT 18 10/07/2020     Magnesium:    Lab Results   Component Value Date    MG 2.1 07/15/2019     LDH:    Lab Results   Component Value Date     02/22/2016     PT/INR:    Lab Results   Component Value Date    PROTIME 13.8 2016    INR 1.2 2016     Last 3 Troponin:    Lab Results   Component Value Date    TROPONINI 0.15 10/08/2020    TROPONINI 0.17 10/07/2020    TROPONINI 0.13 10/07/2020     ABG:  No results found for: PH, PCO2, PO2, HCO3, BE, THGB, TCO2, O2SAT  IRON:  No results found for: IRON  IMAGING    Xr Chest (2 Vw)    Result Date: 10/7/2020  EXAMINATION: TWO XRAY VIEWS OF THE CHEST 10/7/2020 2:25 pm COMPARISON: 2016 HISTORY: ORDERING SYSTEM PROVIDED HISTORY: chest pain TECHNOLOGIST PROVIDED HISTORY: Reason for exam:->chest pain What reading provider will be dictating this exam?->CRC FINDINGS: There is interstitial prominence in perihilar locations. There is mild prominence of pulmonary vasculature. The heart is normal in size. No focal airspace opacity or pleural effusion. 1.  Interstitial prominence in perihilar locations could suggest peribronchial inflammatory changes or mild pulmonary vascular congestion. 2.  No focal airspace opacity or pleural effusion. Us Dup Lower Extremities Bilateral Venous    Result Date: 10/7/2020  Patient MRN:  [id number] : 1960 Age: 64 years Gender: Male Order Date:  10/7/2020 6:08 PM EXAM: US DUP LOWER EXTREMITIES BILATERAL VENOUS NUMBER OF IMAGES:  39 INDICATION:  Discomfort Discomfort What reading provider will be dictating this exam?->[facility] COMPARISON: None There is no evidence for deep venous thrombosis There is good compressibility, there is good augmentation, there is good color flow.      No evidence for deep venous thrombosis       Medications:    Scheduled Meds:   furosemide  80 mg Intravenous BID    spironolactone  25 mg Oral Daily    metFORMIN  1,000 mg Oral BID WC    atorvastatin  10 mg Oral Daily    levothyroxine  100 mcg Oral Daily    losartan  100 mg Oral Daily    magnesium oxide  400 mg Oral Daily    mirabegron  50 mg Oral Daily    potassium chloride  20 mEq Oral BID     vitamin D3 400 Units Oral Daily    vitamin E  400 Units Oral Daily    sodium chloride flush  10 mL Intravenous 2 times per day    enoxaparin  40 mg Subcutaneous Daily    insulin lispro  0-6 Units Subcutaneous TID WC    insulin lispro  0-3 Units Subcutaneous Nightly    ipratropium-albuterol  1 ampule Inhalation Q4H WA    doxycycline hyclate  100 mg Oral 2 times per day    amLODIPine  10 mg Oral Daily       Continuous Infusions:   dextrose         PRN Meds:perflutren lipid microspheres, sodium chloride flush, acetaminophen **OR** acetaminophen, polyethylene glycol, promethazine **OR** ondansetron, glucose, dextrose, glucagon (rDNA), dextrose, perflutren lipid microspheres    A/P:      Patient Active Problem List   Diagnosis    Diastolic dysfunction    Accelerated hypertension    CRI (chronic renal insufficiency)    Acquired hypothyroidism    Type 2 diabetes mellitus without complication, without long-term current use of insulin (HCC)    Class 3 severe obesity due to excess calories without serious comorbidity with body mass index (BMI) of 45.0 to 49.9 in adult [facility])    Essential hypertension    Hyperglycemia    Localized edema due to fluid overload    Fluid overload    Congestive heart failure (HCC)    severe MARLEEN - he had sleep study in 2016 which showed AHI at 79    PLAN:long discussion with patient on MARLEEN/OHS. He's not interested in even discussing it and gets belligerent. He does not want \" the state\"knowing that he is diagnosed with that.

## 2020-10-10 NOTE — PLAN OF CARE
Problem: Cardiac:  Goal: Hemodynamic stability will improve  Description: Hemodynamic stability will improve  Outcome: Met This Shift  Goal: Ability to maintain an adequate cardiac output will improve  Description: Ability to maintain an adequate cardiac output will improve  Outcome: Met This Shift     Problem: Respiratory:  Goal: Ability to maintain adequate ventilation will improve  Description: Ability to maintain adequate ventilation will improve  Outcome: Met This Shift  Goal: Respiratory status will improve  Description: Respiratory status will improve  Outcome: Met This Shift

## 2020-10-10 NOTE — PROGRESS NOTES
INPATIENT CARDIOLOGY FOLLOW-UP    Name: Kirill Abdullahi    Age: 64 y.o. Date of Admission: 10/7/2020  3:17 PM    Date of Service: 10/10/2020    Chief Complaint: Follow-up for decompensated CHF    Interim History:  No new overnight cardiac complaints. He is feeling much better, dyspnea is improved swelling is coming down. Currently with no complaints of CP, SOB, palpitations, dizziness, or lightheadedness. Sinus tachycardia on telemetry.     Review of Systems:   Cardiac: As per HPI  General: No fever, chills  Pulmonary: As per HPI  HEENT: No visual disturbances, difficult swallowing  GI: No nausea, vomiting  Endocrine: No thyroid disease or DM  Musculoskeletal: RAINES x 4, no focal motor deficits  Skin: Intact, no rashes  Neuro/Psych: No headache or seizures    Problem List:  Patient Active Problem List   Diagnosis    Diastolic dysfunction    Accelerated hypertension    CRI (chronic renal insufficiency)    Acquired hypothyroidism    Type 2 diabetes mellitus without complication, without long-term current use of insulin (HCC)    Class 3 severe obesity due to excess calories without serious comorbidity with body mass index (BMI) of 45.0 to 49.9 in adult [facility])    Essential hypertension    Hyperglycemia    Localized edema due to fluid overload    Fluid overload    Congestive heart failure (HCC)       Allergies:  No Known Allergies    Current Medications:  Current Facility-Administered Medications   Medication Dose Route Frequency Provider Last Rate Last Dose    spironolactone (ALDACTONE) tablet 25 mg  25 mg Oral Daily Joleen Barker MD   25 mg at 10/09/20 0943    metFORMIN (GLUCOPHAGE) tablet 1,000 mg  1,000 mg Oral BID  Manny Manrique, DO   1,000 mg at 10/09/20 1825    atorvastatin (LIPITOR) tablet 10 mg  10 mg Oral Daily Manny Manrique, DO   10 mg at 10/09/20 4992    levothyroxine (SYNTHROID) tablet 100 mcg  100 mcg Oral Daily Manny Manrique, DO   100 mcg at 10/09/20 0551    losartan (COZAAR) tablet 100 mg  100 mg Oral Daily Nicholos Crumble, DO   100 mg at 10/09/20 6128    magnesium oxide (MAG-OX) tablet 400 mg  400 mg Oral Daily Nicholos Crumble, DO   400 mg at 10/09/20 3151    mirabegron (MYRBETRIQ) extended release tablet 50 mg  50 mg Oral Daily Nicholos Crumble, DO   50 mg at 10/09/20 0944    potassium chloride (KLOR-CON M) extended release tablet 20 mEq  20 mEq Oral BID  Jacquelins Crumble, DO   20 mEq at 10/09/20 1825    vitamin D3 (CHOLECALCIFEROL) tablet 400 Units  400 Units Oral Daily Nicholos Crumble, DO   400 Units at 10/09/20 1756    vitamin E capsule 400 Units  400 Units Oral Daily Nicholos Crumble, DO   400 Units at 10/09/20 3100    sodium chloride flush 0.9 % injection 10 mL  10 mL Intravenous 2 times per day Jacquelins Crumble, DO   10 mL at 10/09/20 2108    sodium chloride flush 0.9 % injection 10 mL  10 mL Intravenous PRN Jacquelins Crumble, DO        acetaminophen (TYLENOL) tablet 650 mg  650 mg Oral Q6H PRN Jacquelins Crumble, DO        Or    acetaminophen (TYLENOL) suppository 650 mg  650 mg Rectal Q6H PRN Jacquelins Crumble, DO        polyethylene glycol (GLYCOLAX) packet 17 g  17 g Oral Daily PRN Jacquelins Crumble, DO        promethazine (PHENERGAN) tablet 12.5 mg  12.5 mg Oral Q6H PRN Jacquelins Crumble, DO        Or    [facility] injection 4 mg  4 mg Intravenous Q6H PRN Jacquelins Crumble, DO        enoxaparin (LOVENOX) injection 40 mg  40 mg Subcutaneous Daily Nicholos Crumble, DO   40 mg at 10/09/20 0944    insulin lispro (HUMALOG) injection vial 0-6 Units  0-6 Units Subcutaneous TID  Milesolos Crumble, DO        insulin lispro (HUMALOG) injection vial 0-3 Units  0-3 Units Subcutaneous Nightly Milesolos Crumble, DO   1 Units at 10/08/20 2032    glucose (GLUTOSE) 40 % oral gel 15 g  15 g Oral PRN Nicholos Crumble, DO        dextrose 50 % IV solution  12.5 g Intravenous PRN Nicholos Crumble, DO        glucagon (rDNA) injection 1 mg  1 mg Intramuscular PRN Nicholos Crumble, DO        dextrose 5 %  143 139   K 3.6 3.6 3.5    94* 90*   CO2 35* 39* 43*   BUN 14 15 15   CREATININE 1.3* 1.3* 1.4*   GLUCOSE 165* 166* 133*   CALCIUM 8.3* 8.6 8.4*     Lab Results   Component Value Date    MG 2.1 07/15/2019     Recent Labs     10/07/20  1628   ALKPHOS 74   ALT 18   AST 31   PROT 7.1   BILITOT 0.4   LABALBU 3.5     Recent Labs     10/07/20  1628 10/09/20  0536 10/10/20  0520   WBC 11.9* 10.6 10.4   RBC 4.59 4.71 4.15   HGB 11.9* 12.2* 10.8*   HCT 40.7 43.0 37.1   MCV 88.7 91.3 89.4   MCH 25.9* 25.9* 26.0   MCHC 29.2* 28.4* 29.1*   RDW 16.1* 16.3* 16.2*    292 240   MPV 9.4 9.8 9.7     Lab Results   Component Value Date    TROPONINI 0.15 (H) 10/08/2020    TROPONINI 0.17 (H) 10/07/2020    TROPONINI 0.13 (H) 10/07/2020     Lab Results   Component Value Date    INR 1.2 02/21/2016    PROTIME 13.8 (H) 02/21/2016     Lab Results   Component Value Date    TSH 6.930 (H) 09/12/2020     Lab Results   Component Value Date    LABA1C 7.7 05/22/2020     No results found for: EAG  Lab Results   Component Value Date    CHOL 169 07/15/2019    CHOL 164 06/01/2017    CHOL 236 (H) 10/19/2016     Lab Results   Component Value Date    TRIG 193 (H) 07/15/2019    TRIG 257 (H) 06/01/2017    TRIG 178 (H) 10/19/2016     Lab Results   Component Value Date    HDL 40 07/15/2019    HDL 34 06/08/2018    HDL 33 06/01/2017     Lab Results   Component Value Date    LDLCALC 90 07/15/2019    LDLCALC 73 06/08/2018    LDLCALC 80 06/01/2017     Lab Results   Component Value Date    LABVLDL 39 07/15/2019    LABVLDL 17 06/08/2018    LABVLDL 51 06/01/2017     No results found for: CHOLHDLRATIO    Cardiac Tests:    Telemetry findings reviewed: Sinus tachycardia with heart rate in the lower 100s with occasional PVCs    Vitals and labs were reviewed: Pressure 179/97 with heart rate of 102, creatinine 1.3>> 1.4>> 1.3, hemoglobin 10.8    Input output chart is not accurate.     Chest X-ray:     1. [facility]  Normal left ventricle size and systolic function EF 80%,   Moderate concentric left ventricular hypertrophy   Moderately dilated left atrium  Moderately dilated right  Ventricle  Borderline reduced right ventricle systolic function  Markedly  enlarged right atrium size.  Mild mitral regurgitation  Moderate tricuspid regurgitation.  RVSP is 55 mmHg.  Stage III diastolic dysfunction  2. TTE- 10/10/2020:  Normal left ventricle size and systolic function. Ejection fraction is visually estimated at 60-65%. Distal anteroseputum and apex are hypokinetic. Mild concentric left ventricular hypertrophy. Stage II diastolic dysfunction. The left atrium is severely dilated. Normal right ventricular size and function. TAPSE 26 mm. Physiologic and/or trace mitral regurgitation is present. No hemodynamically significant aortic stenosis is present. Unable to estimate PA systolic pressure. No evidence for hemodynamically significant pericardial effusion. Technically difficult examination due to body habitus. Definity echo   contrast was used to delineate endocardial borders. 3. Lexiscan nuclear medicine stress test 2/28/2016: Small fixed perfusion defect over the apical left ventricular myocardium with hypokinetic wall motion suggestive of scarring, no reversible defect noted. EF calculated at 55%      Cardiac catheterization:       ASSESSMENT:  · Acute on chronic heart failure with preserved ejection fraction, volume status is improved now he appears euvolemic.   · Hypertension, is not well controlled today  · Hypothyroidism on HRT with elevated TSH level of 6.9  · Morbid obesity with possible obstructive sleep apnea  · Elevated troponin secondary to NSTEMI versus acute CHF rule out ischemic component  · Mild anemia  · CKD stage III  · Type 2 diabetes  · Lifelong non-smoker  · Hyperlipidemia on low-dose statin    Plan:   · We will change IV Lasix to p.o. 40 mg twice daily  · Spironolactone was added for heart failure with preserved ejection fraction (10/9/2020) continue losartan and amlodipine for hypertension, blood pressure still elevated. We will add Coreg 6.5 mg p.o. twice daily. He has resistant hypertension now on more than 3 medications. Most likely he has uncontrolled hypertension from untreated sleep apnea  · He needs ischemic work-up preferably left heart catheterization on Monday  · Echo results were discussed with the patient has a normal LV function with a stage II diastolic dysfunction. · We will schedule him for cardiac cath in the a.m. keep him n.p.o. after midnight. · Continue rest of the current medications  · Anemia evaluation as per primary service. · He needs sleep study as an outpatient. · If cardiac cath comes back normal without any significant obstruction then he stable for discharge from cardiology standpoint. Lovely Russell MD., Charly Monsivais.   [facility]) Cardiology

## 2020-10-10 NOTE — PROGRESS NOTES
2 d echo completed along with the use of imaging agent definity 2cc given by the floor  jennifer Coelho RCS

## 2020-10-11 LAB
ANION GAP SERPL CALCULATED.3IONS-SCNC: 13 MMOL/L (ref 7–16)
BUN BLDV-MCNC: 16 MG/DL (ref 6–20)
CALCIUM SERPL-MCNC: 8.6 MG/DL (ref 8.6–10.2)
CHLORIDE BLD-SCNC: 90 MMOL/L (ref 98–107)
CO2: 40 MMOL/L (ref 22–29)
CREAT SERPL-MCNC: 1.3 MG/DL (ref 0.7–1.2)
GFR AFRICAN AMERICAN: >60
GFR NON-AFRICAN AMERICAN: 57 ML/MIN/1.73
GLUCOSE BLD-MCNC: 143 MG/DL (ref 74–99)
METER GLUCOSE: 137 MG/DL (ref 74–99)
METER GLUCOSE: 180 MG/DL (ref 74–99)
METER GLUCOSE: 190 MG/DL (ref 74–99)
METER GLUCOSE: 222 MG/DL (ref 74–99)
POTASSIUM SERPL-SCNC: 3.7 MMOL/L (ref 3.5–5)
SODIUM BLD-SCNC: 143 MMOL/L (ref 132–146)

## 2020-10-11 PROCEDURE — 93005 ELECTROCARDIOGRAM TRACING: CPT | Performed by: INTERNAL MEDICINE

## 2020-10-11 PROCEDURE — 6370000000 HC RX 637 (ALT 250 FOR IP): Performed by: INTERNAL MEDICINE

## 2020-10-11 PROCEDURE — 2700000000 HC OXYGEN THERAPY PER DAY

## 2020-10-11 PROCEDURE — 6360000002 HC RX W HCPCS: Performed by: INTERNAL MEDICINE

## 2020-10-11 PROCEDURE — 6370000000 HC RX 637 (ALT 250 FOR IP): Performed by: STUDENT IN AN ORGANIZED HEALTH CARE EDUCATION/TRAINING PROGRAM

## 2020-10-11 PROCEDURE — 99232 SBSQ HOSP IP/OBS MODERATE 35: CPT | Performed by: INTERNAL MEDICINE

## 2020-10-11 PROCEDURE — 82962 GLUCOSE BLOOD TEST: CPT

## 2020-10-11 PROCEDURE — 80048 BASIC METABOLIC PNL TOTAL CA: CPT

## 2020-10-11 PROCEDURE — 2060000000 HC ICU INTERMEDIATE R&B

## 2020-10-11 PROCEDURE — 36415 COLL VENOUS BLD VENIPUNCTURE: CPT

## 2020-10-11 PROCEDURE — 2580000003 HC RX 258: Performed by: INTERNAL MEDICINE

## 2020-10-11 PROCEDURE — 94640 AIRWAY INHALATION TREATMENT: CPT

## 2020-10-11 RX ORDER — CARVEDILOL 6.25 MG/1
6.25 TABLET ORAL 2 TIMES DAILY
Status: DISCONTINUED | OUTPATIENT
Start: 2020-10-11 | End: 2020-10-12

## 2020-10-11 RX ORDER — FUROSEMIDE 40 MG/1
40 TABLET ORAL 2 TIMES DAILY
Status: DISCONTINUED | OUTPATIENT
Start: 2020-10-11 | End: 2020-10-12 | Stop reason: HOSPADM

## 2020-10-11 RX ADMIN — FUROSEMIDE 40 MG: 40 TABLET ORAL at 17:26

## 2020-10-11 RX ADMIN — SODIUM CHLORIDE, PRESERVATIVE FREE 10 ML: 5 INJECTION INTRAVENOUS at 20:38

## 2020-10-11 RX ADMIN — Medication 400 UNITS: at 08:59

## 2020-10-11 RX ADMIN — AMLODIPINE BESYLATE 10 MG: 10 TABLET ORAL at 09:00

## 2020-10-11 RX ADMIN — INSULIN LISPRO 1 UNITS: 100 INJECTION, SOLUTION INTRAVENOUS; SUBCUTANEOUS at 13:34

## 2020-10-11 RX ADMIN — POTASSIUM CHLORIDE 20 MEQ: 1500 TABLET, EXTENDED RELEASE ORAL at 17:22

## 2020-10-11 RX ADMIN — CARVEDILOL 6.25 MG: 6.25 TABLET, FILM COATED ORAL at 20:38

## 2020-10-11 RX ADMIN — CHOLECALCIFEROL TAB 10 MCG (400 UNIT) 400 UNITS: 10 TAB at 09:00

## 2020-10-11 RX ADMIN — POTASSIUM CHLORIDE 20 MEQ: 1500 TABLET, EXTENDED RELEASE ORAL at 09:00

## 2020-10-11 RX ADMIN — FUROSEMIDE 80 MG: 10 INJECTION, SOLUTION INTRAVENOUS at 09:00

## 2020-10-11 RX ADMIN — IPRATROPIUM BROMIDE AND ALBUTEROL SULFATE 1 AMPULE: 2.5; .5 SOLUTION RESPIRATORY (INHALATION) at 10:05

## 2020-10-11 RX ADMIN — DOXYCYCLINE HYCLATE 100 MG: 100 CAPSULE ORAL at 20:39

## 2020-10-11 RX ADMIN — METFORMIN HYDROCHLORIDE 1000 MG: 1000 TABLET ORAL at 08:59

## 2020-10-11 RX ADMIN — INSULIN LISPRO 2 UNITS: 100 INJECTION, SOLUTION INTRAVENOUS; SUBCUTANEOUS at 17:25

## 2020-10-11 RX ADMIN — METFORMIN HYDROCHLORIDE 1000 MG: 1000 TABLET ORAL at 17:22

## 2020-10-11 RX ADMIN — MAGNESIUM GLUCONATE 500 MG ORAL TABLET 400 MG: 500 TABLET ORAL at 08:59

## 2020-10-11 RX ADMIN — SODIUM CHLORIDE, PRESERVATIVE FREE 10 ML: 5 INJECTION INTRAVENOUS at 09:00

## 2020-10-11 RX ADMIN — LEVOTHYROXINE SODIUM 100 MCG: 0.1 TABLET ORAL at 05:52

## 2020-10-11 RX ADMIN — ATORVASTATIN CALCIUM 10 MG: 10 TABLET, FILM COATED ORAL at 09:00

## 2020-10-11 RX ADMIN — IPRATROPIUM BROMIDE AND ALBUTEROL SULFATE 1 AMPULE: 2.5; .5 SOLUTION RESPIRATORY (INHALATION) at 13:22

## 2020-10-11 RX ADMIN — DOXYCYCLINE HYCLATE 100 MG: 100 CAPSULE ORAL at 09:00

## 2020-10-11 RX ADMIN — LOSARTAN POTASSIUM 100 MG: 50 TABLET, FILM COATED ORAL at 08:59

## 2020-10-11 RX ADMIN — IPRATROPIUM BROMIDE AND ALBUTEROL SULFATE 1 AMPULE: 2.5; .5 SOLUTION RESPIRATORY (INHALATION) at 21:23

## 2020-10-11 RX ADMIN — SPIRONOLACTONE 25 MG: 25 TABLET ORAL at 08:59

## 2020-10-11 RX ADMIN — ENOXAPARIN SODIUM 40 MG: 40 INJECTION SUBCUTANEOUS at 09:00

## 2020-10-11 RX ADMIN — IPRATROPIUM BROMIDE AND ALBUTEROL SULFATE 1 AMPULE: 2.5; .5 SOLUTION RESPIRATORY (INHALATION) at 17:01

## 2020-10-11 ASSESSMENT — PAIN DESCRIPTION - DESCRIPTORS: DESCRIPTORS: ACHING;JABBING;DISCOMFORT

## 2020-10-11 ASSESSMENT — PAIN DESCRIPTION - LOCATION: LOCATION: WRIST

## 2020-10-11 ASSESSMENT — PAIN SCALES - GENERAL
PAINLEVEL_OUTOF10: 5
PAINLEVEL_OUTOF10: 0

## 2020-10-11 ASSESSMENT — PAIN DESCRIPTION - ORIENTATION: ORIENTATION: RIGHT

## 2020-10-11 NOTE — PLAN OF CARE
Problem:  Activity:  Goal: Capacity to carry out activities will improve  Description: Capacity to carry out activities will improve  Outcome: Met This Shift  Goal: Will verbalize the importance of balancing activity with adequate rest periods  Description: Will verbalize the importance of balancing activity with adequate rest periods  Outcome: Met This Shift     Problem: Cardiac:  Goal: Hemodynamic stability will improve  Description: Hemodynamic stability will improve  Outcome: Met This Shift  Goal: Ability to maintain an adequate cardiac output will improve  Description: Ability to maintain an adequate cardiac output will improve  Outcome: Met This Shift     Problem: Coping:  Goal: Verbalizations of decreased anxiety will decrease  Description: Verbalizations of decreased anxiety will decrease  Outcome: Met This Shift     Problem: Fluid Volume:  Goal: Risk for excess fluid volume will decrease  Description: Risk for excess fluid volume will decrease  Outcome: Met This Shift  Goal: Maintenance of adequate hydration will improve  Description: Maintenance of adequate hydration will improve  Outcome: Met This Shift  Goal: Will show no signs and symptoms of electrolyte imbalance  Description: Will show no signs and symptoms of electrolyte imbalance  Outcome: Met This Shift     Problem: Health Behavior:  Goal: Ability to manage health-related needs will improve  Description: Ability to manage health-related needs will improve  Outcome: Met This Shift  Goal: Ability to seek appropriate health care will improve  Description: Ability to seek appropriate health care will improve  Outcome: Met This Shift     Problem: Nutritional:  Goal: Maintenance of adequate nutrition will improve  Description: Maintenance of adequate nutrition will improve  Outcome: Met This Shift     Problem: Physical Regulation:  Goal: Complications related to the disease process, condition or treatment will be avoided or minimized  Description: Complications related to the disease process, condition or treatment will be avoided or minimized  Outcome: Met This Shift     Problem: Respiratory:  Goal: Ability to maintain adequate ventilation will improve  Description: Ability to maintain adequate ventilation will improve  Outcome: Met This Shift  Goal: Respiratory status will improve  Description: Respiratory status will improve  Outcome: Met This Shift     Problem: Falls - Risk of:  Goal: Will remain free from falls  Description: Will remain free from falls  Outcome: Met This Shift  Goal: Absence of physical injury  Description: Absence of physical injury  Outcome: Met This Shift

## 2020-10-11 NOTE — PROGRESS NOTES
PT SEEN AND EXAMINED. Chart reviewed. meds reviewed. D/w nursing + family as available. met with patient and wife discussed details for over 30 minutes  EXAM: IN GENERAL, NAD. AWAKE AND ALERT. ROS NEGx10 EXCEPT:BP (!) 145/91   Pulse 91   Temp 97.6 °F (36.4 °C) (Temporal)   Resp 20   Ht 6' 3\" (1.905 m)   Wt (!) 345 lb 6.4 oz (156.7 kg)   SpO2 95%   BMI 43.17 kg/m²   GEN: A+O NAD. HEENT: NCAT. EOMI. ZENIA  NECK: NO JVD. TRACH MIDLINE. NO BRUITS. NO THYROMEGALY. LUNGS: CTA BL NO RALES, RHONCHI OR WHEEZES. GOOD EXCURSION. CV: Regular rate and rhythm, NO Murmurs, Rubs, Or gallops  ABD: Soft. Nontender. Normal bowel sounds. No organomegaly  EXT:No clubbing cyanosis or edema  Neuro: Alert and oriented x 3. No focal motor deficits. No sensory deficits. Reflexes appear intact.   Labs/data reviewedLABS: CBC with Differential:    Lab Results   Component Value Date    WBC 10.4 10/10/2020    RBC 4.15 10/10/2020    HGB 10.8 10/10/2020    HCT 37.1 10/10/2020     10/10/2020    MCV 89.4 10/10/2020    MCH 26.0 10/10/2020    MCHC 29.1 10/10/2020    RDW 16.2 10/10/2020    LYMPHOPCT 3.5 10/09/2020    MONOPCT 6.1 10/09/2020    BASOPCT 0.4 10/09/2020    MONOSABS 0.64 10/09/2020    LYMPHSABS 0.42 10/09/2020    EOSABS 0.19 10/09/2020    BASOSABS 0.00 10/09/2020     Platelets:    Lab Results   Component Value Date     10/10/2020     CMP:    Lab Results   Component Value Date     10/11/2020    K 3.7 10/11/2020    K 4.2 10/07/2020    CL 90 10/11/2020    CO2 40 10/11/2020    BUN 16 10/11/2020    CREATININE 1.3 10/11/2020    GFRAA >60 10/11/2020    LABGLOM 57 10/11/2020    GLUCOSE 143 10/11/2020    PROT 7.1 10/07/2020    LABALBU 3.5 10/07/2020    CALCIUM 8.6 10/11/2020    BILITOT 0.4 10/07/2020    ALKPHOS 74 10/07/2020    AST 31 10/07/2020    ALT 18 10/07/2020     Magnesium:    Lab Results   Component Value Date    MG 2.1 07/15/2019     LDH:    Lab Results   Component Value Date     02/22/2016     PT/INR: vitamin D3  400 Units Oral Daily    vitamin E  400 Units Oral Daily    sodium chloride flush  10 mL Intravenous 2 times per day    enoxaparin  40 mg Subcutaneous Daily    insulin lispro  0-6 Units Subcutaneous TID     insulin lispro  0-3 Units Subcutaneous Nightly    ipratropium-albuterol  1 ampule Inhalation Q4H WA    doxycycline hyclate  100 mg Oral 2 times per day    amLODIPine  10 mg Oral Daily       Continuous Infusions:   dextrose         PRN Meds:sodium chloride flush, acetaminophen **OR** acetaminophen, polyethylene glycol, promethazine **OR** ondansetron, glucose, dextrose, glucagon (rDNA), dextrose, perflutren lipid microspheres    A/P:      Patient Active Problem List   Diagnosis    Diastolic dysfunction    Accelerated hypertension    CRI (chronic renal insufficiency)    Acquired hypothyroidism    Type 2 diabetes mellitus without complication, without long-term current use of insulin (HCC)    Class 3 severe obesity due to excess calories without serious comorbidity with body mass index (BMI) of 45.0 to 49.9 in adult [facility])    Essential hypertension    Hyperglycemia    Localized edema due to fluid overload    Fluid overload    Congestive heart failure (HCC)    severe MARLEEN - he had sleep study in 2016 which showed AHI at 79    PLAN:long discussion with patient/wife on hospital stay

## 2020-10-12 VITALS
RESPIRATION RATE: 16 BRPM | SYSTOLIC BLOOD PRESSURE: 138 MMHG | HEART RATE: 85 BPM | DIASTOLIC BLOOD PRESSURE: 77 MMHG | OXYGEN SATURATION: 95 % | WEIGHT: 315 LBS | TEMPERATURE: 97.1 F | BODY MASS INDEX: 39.17 KG/M2 | HEIGHT: 75 IN

## 2020-10-12 LAB
ABO/RH: NORMAL
ANION GAP SERPL CALCULATED.3IONS-SCNC: 12 MMOL/L (ref 7–16)
ANTIBODY SCREEN: NORMAL
BASOPHILS ABSOLUTE: 0.17 E9/L (ref 0–0.2)
BASOPHILS RELATIVE PERCENT: 1.7 % (ref 0–2)
BLOOD CULTURE, ROUTINE: NORMAL
BUN BLDV-MCNC: 15 MG/DL (ref 6–20)
CALCIUM SERPL-MCNC: 9.2 MG/DL (ref 8.6–10.2)
CHLORIDE BLD-SCNC: 92 MMOL/L (ref 98–107)
CO2: 41 MMOL/L (ref 22–29)
CREAT SERPL-MCNC: 1.2 MG/DL (ref 0.7–1.2)
CULTURE, BLOOD 2: NORMAL
EKG ATRIAL RATE: 99 BPM
EKG P AXIS: 31 DEGREES
EKG P-R INTERVAL: 180 MS
EKG Q-T INTERVAL: 404 MS
EKG QRS DURATION: 96 MS
EKG QTC CALCULATION (BAZETT): 518 MS
EKG R AXIS: 13 DEGREES
EKG T AXIS: 63 DEGREES
EKG VENTRICULAR RATE: 99 BPM
EOSINOPHILS ABSOLUTE: 0.26 E9/L (ref 0.05–0.5)
EOSINOPHILS RELATIVE PERCENT: 2.6 % (ref 0–6)
GFR AFRICAN AMERICAN: >60
GFR NON-AFRICAN AMERICAN: >60 ML/MIN/1.73
GLUCOSE BLD-MCNC: 135 MG/DL (ref 74–99)
HCT VFR BLD CALC: 40.3 % (ref 37–54)
HEMOGLOBIN: 11.5 G/DL (ref 12.5–16.5)
HYPOCHROMIA: ABNORMAL
INR BLD: 1.1
LYMPHOCYTES ABSOLUTE: 0.5 E9/L (ref 1.5–4)
LYMPHOCYTES RELATIVE PERCENT: 5.2 % (ref 20–42)
MCH RBC QN AUTO: 25.8 PG (ref 26–35)
MCHC RBC AUTO-ENTMCNC: 28.5 % (ref 32–34.5)
MCV RBC AUTO: 90.6 FL (ref 80–99.9)
METER GLUCOSE: 133 MG/DL (ref 74–99)
METER GLUCOSE: 207 MG/DL (ref 74–99)
MONOCYTES ABSOLUTE: 0.3 E9/L (ref 0.1–0.95)
MONOCYTES RELATIVE PERCENT: 2.6 % (ref 2–12)
NEUTROPHILS ABSOLUTE: 8.71 E9/L (ref 1.8–7.3)
NEUTROPHILS RELATIVE PERCENT: 87.8 % (ref 43–80)
OVALOCYTES: ABNORMAL
PDW BLD-RTO: 16 FL (ref 11.5–15)
PLATELET # BLD: 232 E9/L (ref 130–450)
PMV BLD AUTO: 9.9 FL (ref 7–12)
POIKILOCYTES: ABNORMAL
POLYCHROMASIA: ABNORMAL
POTASSIUM SERPL-SCNC: 4.4 MMOL/L (ref 3.5–5)
PROTHROMBIN TIME: 12.7 SEC (ref 9.3–12.4)
RBC # BLD: 4.45 E12/L (ref 3.8–5.8)
SODIUM BLD-SCNC: 145 MMOL/L (ref 132–146)
STOMATOCYTES: ABNORMAL
WBC # BLD: 9.9 E9/L (ref 4.5–11.5)

## 2020-10-12 PROCEDURE — 4A023N7 MEASUREMENT OF CARDIAC SAMPLING AND PRESSURE, LEFT HEART, PERCUTANEOUS APPROACH: ICD-10-PCS | Performed by: INTERNAL MEDICINE

## 2020-10-12 PROCEDURE — 93458 L HRT ARTERY/VENTRICLE ANGIO: CPT | Performed by: INTERNAL MEDICINE

## 2020-10-12 PROCEDURE — 6370000000 HC RX 637 (ALT 250 FOR IP): Performed by: INTERNAL MEDICINE

## 2020-10-12 PROCEDURE — C1769 GUIDE WIRE: HCPCS

## 2020-10-12 PROCEDURE — 2500000003 HC RX 250 WO HCPCS

## 2020-10-12 PROCEDURE — 6370000000 HC RX 637 (ALT 250 FOR IP): Performed by: STUDENT IN AN ORGANIZED HEALTH CARE EDUCATION/TRAINING PROGRAM

## 2020-10-12 PROCEDURE — 93010 ELECTROCARDIOGRAM REPORT: CPT | Performed by: INTERNAL MEDICINE

## 2020-10-12 PROCEDURE — 6360000002 HC RX W HCPCS: Performed by: INTERNAL MEDICINE

## 2020-10-12 PROCEDURE — 86850 RBC ANTIBODY SCREEN: CPT

## 2020-10-12 PROCEDURE — 85610 PROTHROMBIN TIME: CPT

## 2020-10-12 PROCEDURE — 2580000003 HC RX 258: Performed by: INTERNAL MEDICINE

## 2020-10-12 PROCEDURE — 36415 COLL VENOUS BLD VENIPUNCTURE: CPT

## 2020-10-12 PROCEDURE — 6360000002 HC RX W HCPCS

## 2020-10-12 PROCEDURE — C1887 CATHETER, GUIDING: HCPCS

## 2020-10-12 PROCEDURE — 82962 GLUCOSE BLOOD TEST: CPT

## 2020-10-12 PROCEDURE — 99024 POSTOP FOLLOW-UP VISIT: CPT | Performed by: INTERNAL MEDICINE

## 2020-10-12 PROCEDURE — 85025 COMPLETE CBC W/AUTO DIFF WBC: CPT

## 2020-10-12 PROCEDURE — 86900 BLOOD TYPING SEROLOGIC ABO: CPT

## 2020-10-12 PROCEDURE — 93458 L HRT ARTERY/VENTRICLE ANGIO: CPT

## 2020-10-12 PROCEDURE — B2111ZZ FLUOROSCOPY OF MULTIPLE CORONARY ARTERIES USING LOW OSMOLAR CONTRAST: ICD-10-PCS | Performed by: INTERNAL MEDICINE

## 2020-10-12 PROCEDURE — C1894 INTRO/SHEATH, NON-LASER: HCPCS

## 2020-10-12 PROCEDURE — 2700000000 HC OXYGEN THERAPY PER DAY

## 2020-10-12 PROCEDURE — 2709999900 HC NON-CHARGEABLE SUPPLY

## 2020-10-12 PROCEDURE — 86901 BLOOD TYPING SEROLOGIC RH(D): CPT

## 2020-10-12 PROCEDURE — 80048 BASIC METABOLIC PNL TOTAL CA: CPT

## 2020-10-12 RX ORDER — SODIUM CHLORIDE 0.9 % (FLUSH) 0.9 %
10 SYRINGE (ML) INJECTION PRN
Status: DISCONTINUED | OUTPATIENT
Start: 2020-10-12 | End: 2020-10-12 | Stop reason: HOSPADM

## 2020-10-12 RX ORDER — AMLODIPINE BESYLATE 10 MG/1
10 TABLET ORAL DAILY
Qty: 30 TABLET | Refills: 3 | Status: SHIPPED | OUTPATIENT
Start: 2020-10-13 | End: 2020-10-28 | Stop reason: SDUPTHER

## 2020-10-12 RX ORDER — ASPIRIN 325 MG
325 TABLET ORAL ONCE
Status: COMPLETED | OUTPATIENT
Start: 2020-10-12 | End: 2020-10-12

## 2020-10-12 RX ORDER — ASPIRIN 81 MG/1
81 TABLET, CHEWABLE ORAL DAILY
Qty: 30 TABLET | Refills: 3 | Status: SHIPPED | OUTPATIENT
Start: 2020-10-13 | End: 2020-10-28 | Stop reason: ALTCHOICE

## 2020-10-12 RX ORDER — FUROSEMIDE 40 MG/1
40 TABLET ORAL 2 TIMES DAILY
Qty: 60 TABLET | Refills: 3 | Status: SHIPPED | OUTPATIENT
Start: 2020-10-12 | End: 2020-10-28 | Stop reason: SDUPTHER

## 2020-10-12 RX ORDER — CARVEDILOL 6.25 MG/1
12.5 TABLET ORAL 2 TIMES DAILY
Status: DISCONTINUED | OUTPATIENT
Start: 2020-10-12 | End: 2020-10-12 | Stop reason: HOSPADM

## 2020-10-12 RX ORDER — ASPIRIN 81 MG/1
81 TABLET, CHEWABLE ORAL DAILY
Status: DISCONTINUED | OUTPATIENT
Start: 2020-10-12 | End: 2020-10-12 | Stop reason: HOSPADM

## 2020-10-12 RX ORDER — CARVEDILOL 12.5 MG/1
12.5 TABLET ORAL 2 TIMES DAILY
Qty: 60 TABLET | Refills: 3 | Status: SHIPPED | OUTPATIENT
Start: 2020-10-12 | End: 2020-10-28 | Stop reason: SDUPTHER

## 2020-10-12 RX ORDER — NITROGLYCERIN 0.4 MG/1
TABLET SUBLINGUAL
Qty: 25 TABLET | Refills: 3 | Status: SHIPPED | OUTPATIENT
Start: 2020-10-12 | End: 2022-07-14 | Stop reason: SDUPTHER

## 2020-10-12 RX ORDER — SPIRONOLACTONE 25 MG/1
25 TABLET ORAL DAILY
Qty: 30 TABLET | Refills: 3 | Status: SHIPPED | OUTPATIENT
Start: 2020-10-13 | End: 2020-10-28 | Stop reason: SDUPTHER

## 2020-10-12 RX ORDER — NITROGLYCERIN 0.4 MG/1
0.4 TABLET SUBLINGUAL EVERY 5 MIN PRN
Status: DISCONTINUED | OUTPATIENT
Start: 2020-10-12 | End: 2020-10-12 | Stop reason: HOSPADM

## 2020-10-12 RX ORDER — ACETAMINOPHEN 325 MG/1
650 TABLET ORAL EVERY 4 HOURS PRN
Status: DISCONTINUED | OUTPATIENT
Start: 2020-10-12 | End: 2020-10-12 | Stop reason: HOSPADM

## 2020-10-12 RX ORDER — SODIUM CHLORIDE 0.9 % (FLUSH) 0.9 %
10 SYRINGE (ML) INJECTION EVERY 12 HOURS SCHEDULED
Status: DISCONTINUED | OUTPATIENT
Start: 2020-10-12 | End: 2020-10-12 | Stop reason: HOSPADM

## 2020-10-12 RX ADMIN — ASPIRIN 81 MG: 81 TABLET, CHEWABLE ORAL at 13:53

## 2020-10-12 RX ADMIN — INSULIN LISPRO 2 UNITS: 100 INJECTION, SOLUTION INTRAVENOUS; SUBCUTANEOUS at 11:55

## 2020-10-12 RX ADMIN — SPIRONOLACTONE 25 MG: 25 TABLET ORAL at 11:54

## 2020-10-12 RX ADMIN — DOXYCYCLINE HYCLATE 100 MG: 100 CAPSULE ORAL at 11:52

## 2020-10-12 RX ADMIN — CHOLECALCIFEROL TAB 10 MCG (400 UNIT) 400 UNITS: 10 TAB at 11:52

## 2020-10-12 RX ADMIN — POTASSIUM CHLORIDE 20 MEQ: 1500 TABLET, EXTENDED RELEASE ORAL at 17:23

## 2020-10-12 RX ADMIN — LOSARTAN POTASSIUM 100 MG: 50 TABLET, FILM COATED ORAL at 11:52

## 2020-10-12 RX ADMIN — ENOXAPARIN SODIUM 40 MG: 40 INJECTION SUBCUTANEOUS at 11:53

## 2020-10-12 RX ADMIN — AMLODIPINE BESYLATE 10 MG: 10 TABLET ORAL at 11:52

## 2020-10-12 RX ADMIN — LEVOTHYROXINE SODIUM 100 MCG: 0.1 TABLET ORAL at 05:41

## 2020-10-12 RX ADMIN — MAGNESIUM GLUCONATE 500 MG ORAL TABLET 400 MG: 500 TABLET ORAL at 11:52

## 2020-10-12 RX ADMIN — ATORVASTATIN CALCIUM 10 MG: 10 TABLET, FILM COATED ORAL at 11:52

## 2020-10-12 RX ADMIN — METFORMIN HYDROCHLORIDE 1000 MG: 1000 TABLET ORAL at 11:52

## 2020-10-12 RX ADMIN — CARVEDILOL 12.5 MG: 6.25 TABLET, FILM COATED ORAL at 11:51

## 2020-10-12 RX ADMIN — POTASSIUM CHLORIDE 20 MEQ: 1500 TABLET, EXTENDED RELEASE ORAL at 11:52

## 2020-10-12 RX ADMIN — Medication 400 UNITS: at 11:51

## 2020-10-12 RX ADMIN — ASPIRIN 325 MG: 325 TABLET, FILM COATED ORAL at 07:15

## 2020-10-12 RX ADMIN — SODIUM CHLORIDE, PRESERVATIVE FREE 10 ML: 5 INJECTION INTRAVENOUS at 11:53

## 2020-10-12 ASSESSMENT — PAIN SCALES - GENERAL
PAINLEVEL_OUTOF10: 0

## 2020-10-12 NOTE — CARE COORDINATION
Return call received from Vanesa Scott with [facility]. They are unable to provide Home O2 at this time as they are not able to guarantee home delivery tomorrow of concentrator. Call placed to Leanne Bird with [employer] and they are able to provide home delivery and tank for discharge this evening. Spoke with nursing, they will obtain DME order for discharge. Anticipate discharge to home this evening.      Jody Guerra.  P: [phone]

## 2020-10-12 NOTE — PROCEDURES
RPL.  The RPDA gives prominent septal collaterals to the LAD. The RPL appears to give faint epicardial collaterals to a diagonal.        Hemodynamics:  LVEDP 26  Ao: 144/87 with a mean of 112    Hemostasis:  Transradial band was applied for patent arterial hemostasis. Complications: None  Estimated blood loss: 5 mL  Contrast used: 90 mL  Air kerma: 654 mGy    Conclusions:  1. Mid LAD  with prominent right to left septal collaterals from the RPDA. J- score 1 for blunt stump  2. Otherwise mild diffuse CAD  3. Elevated left filling pressure    PLAN:  1. Continue diuresis and medical management for diastolic heart failure  2. Follow-up 1 week after discharge with Lorna Castro NP, in the heart failure clinic  3. Follow-up with outpatient cardiology in 4 weeks  4. The LAD distribution appears viable on resting TTE. Given his young age I recommend LAD revascularization.  PCI of the LAD appears feasible with features generally consistent with high success rate. I am happy to see him in consult regarding that. 5. IV Lasix 20 mg given in the Cath Lab. Other medications on discharge per the progress note from earlier today.     Irineo Wayne MD  Interventional Cardiology/Structural Heart Disease  Cell: [phone]

## 2020-10-12 NOTE — CARE COORDINATION
Ambulatory pulse ox testing noted from nursing. Patient will require home O2 at 2L at discharge. Previous Cm note stated patient had o2 through [employer]. Call placed to [facility], patient is not a client of [facility]. Chart review completed and [facility] placed [facility]. and spoke to Willis Guerra. Patient was a client of [employer] 4 years ago, however the patient changed companies and [facility] equipment was picked up. Calls placed to [employer], [facility], and [employer]. None provide home o2 to the patient. Attempted to meet with the patient at the bedside, however there is another clinician at the bedside at this time. Will follow-up as available.      Jody Guerra.  P:  [phone]

## 2020-10-12 NOTE — PROGRESS NOTES
Pulse ox was __94___ % on room air at rest.  Ambulated patient on room air. Oxygen saturation was _86_____% on room air while ambulating. Oxygen applied  Recovery pulse ox was _94___% on ___2__ liters of oxygen while ambulating.

## 2020-10-12 NOTE — PROGRESS NOTES
Verbal conversation had with Onofre with cardiology. During conversation she said to be sure to order the [facility] for patient upon discharge. In addition, she said to send patient with script for labwork to be done prior to his follow up cardiology appointment.

## 2020-10-12 NOTE — CARE COORDINATION
Followed-up with the patient and family at the bedside to discus DME providers. Explained that the patient had a history of home O2 through [address] four years ago, but also offered list.  Patient is agreeable to [facility] DME for home O2. Call placed to [facility], liaison with [address] [place] [address] DME with referral for Home O2. Per [facility] they have tanks and portable concentrators to provide to the patient, she just needs to verify that [facility] will be able to deliver a concentrator to his home tomorrow. [facility] will notify this CM if they can accept the referral.  Bedside nurse Noemy Dunlap notified that the patient will need a DME order for home O2 as this will be a new order. Will continue to follow. Malu French:  [phone]    The Plan for Transition of Care is related to the following treatment goals: provide oxygen for discharge to home. The Patient and/or patient representative was provided with a choice of provider and agrees   with the discharge plan. [x] Yes [] No    Freedom of choice list was provided with basic dialogue that supports the patient's individualized plan of care/goals, treatment preferences and shares the quality data associated with the providers.  [x] Yes [] No

## 2020-10-12 NOTE — PROGRESS NOTES
Educated patient on importance of getting accurate input/output. Patient states again that it is easier for him to just urinate in the commode and he will not use the urinals.

## 2020-10-12 NOTE — PROGRESS NOTES
CLINICAL PHARMACY NOTE: MEDS TO [address] Select Patient?: No  Total # of Prescriptions Filled: 6   The following medications were delivered to the patient:  · Amlodipine 10  · Carvedilol 12.5   · Furosemide 40  · Aspirin 81  · Spironolactone 25  · Nitro 0.25 sub   Total # of Interventions Completed: 3  Time Spent (min): 30    Additional Documentation:

## 2020-10-12 NOTE — PROGRESS NOTES
[facility]) Beta Blocker handout given to patient-- for new medication (Coreg).

## 2020-10-12 NOTE — PLAN OF CARE
Problem:  Activity:  Goal: Capacity to carry out activities will improve  Description: Capacity to carry out activities will improve  Outcome: Met This Shift  Goal: Will verbalize the importance of balancing activity with adequate rest periods  Description: Will verbalize the importance of balancing activity with adequate rest periods  Outcome: Met This Shift     Problem: Cardiac:  Goal: Hemodynamic stability will improve  Description: Hemodynamic stability will improve  Outcome: Met This Shift  Goal: Ability to maintain an adequate cardiac output will improve  Description: Ability to maintain an adequate cardiac output will improve  Outcome: Met This Shift     Problem: Coping:  Goal: Verbalizations of decreased anxiety will decrease  Description: Verbalizations of decreased anxiety will decrease  Outcome: Met This Shift     Problem: Fluid Volume:  Goal: Risk for excess fluid volume will decrease  Description: Risk for excess fluid volume will decrease  Outcome: Met This Shift  Goal: Maintenance of adequate hydration will improve  Description: Maintenance of adequate hydration will improve  Outcome: Met This Shift  Goal: Will show no signs and symptoms of electrolyte imbalance  Description: Will show no signs and symptoms of electrolyte imbalance  Outcome: Met This Shift     Problem: Health Behavior:  Goal: Ability to manage health-related needs will improve  Description: Ability to manage health-related needs will improve  Outcome: Met This Shift  Goal: Ability to seek appropriate health care will improve  Description: Ability to seek appropriate health care will improve  Outcome: Met This Shift     Problem: Nutritional:  Goal: Maintenance of adequate nutrition will improve  Description: Maintenance of adequate nutrition will improve  Outcome: Met This Shift     Problem: Physical Regulation:  Goal: Complications related to the disease process, condition or treatment will be avoided or minimized  Description: Complications related to the disease process, condition or treatment will be avoided or minimized  Outcome: Met This Shift     Problem: Respiratory:  Goal: Ability to maintain adequate ventilation will improve  Description: Ability to maintain adequate ventilation will improve  Outcome: Met This Shift  Goal: Respiratory status will improve  Description: Respiratory status will improve  Outcome: Met This Shift     Problem: Falls - Risk of:  Goal: Will remain free from falls  Description: Will remain free from falls  Outcome: Met This Shift  Goal: Absence of physical injury  Description: Absence of physical injury  Outcome: Met This Shift     Problem: Pain:  Goal: Pain level will decrease  Description: Pain level will decrease  Outcome: Met This Shift  Goal: Control of acute pain  Description: Control of acute pain  Outcome: Met This Shift  Goal: Control of chronic pain  Description: Control of chronic pain  Outcome: Met This Shift

## 2020-10-12 NOTE — PROGRESS NOTES
Discharge instructions reviewed with patient and wife. Discussed when to call 911/making and keeping follow up appointments/and discharge medications. Discussed heart cath splint removal in 24 hrs. IV and telemetry monitor removed.

## 2020-10-12 NOTE — PROGRESS NOTES
Subjective: The patient is awake and alert. No problems overnight. Denies chest pain, angina, and dyspnea. Denies abdominal pain. Tolerating diet. No nausea or vomiting. He states he is breathing better, ambulating well and has no sob. Objective:  Pt is aox 3 in nad   /77   Pulse 97   Temp 97.7 °F (36.5 °C) (Temporal)   Resp 16   Ht 6' 3\" (1.905 m)   Wt (!) 349 lb 3.2 oz (158.4 kg)   SpO2 93%   BMI 43.65 kg/m²   HEENT no adenopathy no bruits  Heart:  RRR, no murmurs, gallops, or rubs. Lungs:  CTA bilaterally, no wheeze, rales or rhonchi  Abd: bowel sounds present, nontender, nondistended, no masses  Extrem:  No clubbing, cyanosis, +edema lower   WBC/Hgb/Hct/Plts:  9.9/11.5/40.3/232 (10/12 0782) basic metabolic panel     Assessment:    Patient Active Problem List   Diagnosis    Diastolic dysfunction    Accelerated hypertension    CRI (chronic renal insufficiency)    Acquired hypothyroidism    Type 2 diabetes mellitus without complication, without long-term current use of insulin (HCC)    Class 3 severe obesity due to excess calories without serious comorbidity with body mass index (BMI) of 45.0 to 49.9 in adult [facility])    Essential hypertension    Hyperglycemia    Localized edema due to fluid overload    Fluid overload    Congestive heart failure (HCC)    Acute on chronic diastolic heart failure ([facility])       Plan:     For Cardiac cath today       Chelita Blevins  7:38 AM  10/12/2020

## 2020-10-12 NOTE — PROGRESS NOTES
Inpatient Cardiology Follow-up      Reason for Consult: Acute on chronic diastolic heart failure    Interval HPI:    Blood pressure remains on the elevated side   I/O not accurate   Labs not drawn this morning   No new complaints. On room air this morning      PHYSICAL EXAM:  /77   Pulse 97   Temp 97.7 °F (36.5 °C) (Temporal)   Resp 16   Ht 6' 3\" (1.905 m)   Wt (!) 349 lb 3.2 oz (158.4 kg)   SpO2 93%   BMI 43.65 kg/m²     General Appearance:    Alert, cooperative, no distress, appears stated age    Head:    Normocephalic, without obvious abnormality, atraumatic    Eyes:    PERRL, conjunctiva/corneas clear, EOM's intact    Neck:   Supple, symmetrical, trachea midline; no carotid    bruit or JVP    Lungs:     Clear to auscultation bilaterally, respirations unlabored, no wheezing, rales or rhonchi    Heart:    Regular rate and rhythm, no murmur, rub   or gallop    Abdomen:     Soft, non-tender, non-distended    Extremities:   Extremities normal, atraumatic, no cyanosis or edema    Skin:   Skin color, texture, turgor normal for age    Neurologic:   Oriented x3, CNII-XII intact. Normal gross strength and sensation       DATA:      Intake/Output Summary (Last 24 hours) at 10/12/2020 0619  Last data filed at 10/11/2020 2040  Gross per 24 hour   Intake 535 ml   Output --   Net 535 ml       Labs:   CBC:   Recent Labs     10/10/20  0520   WBC 10.4   HGB 10.8*   HCT 37.1        BMP:   Recent Labs     10/10/20  0520 10/11/20  0514    143   K 3.5 3.7   CO2 43* 40*   BUN 15 16   CREATININE 1.4* 1.3*   LABGLOM 52 57   CALCIUM 8.4* 8.6     Mag: No results for input(s): MG in the last 72 hours. Phos: No results for input(s): PHOS in the last 72 hours. TSH: No results for input(s): TSH in the last 72 hours. HgA1c:   Lab Results   Component Value Date    LABA1C 7.7 05/22/2020     No results found for: EAG  proBNP: No results for input(s): PROBNP in the last 72 hours.   PT/INR: No results for input(s): PROTIME, INR in the last 72 hours. APTT:No results for input(s): APTT in the last 72 hours. CARDIAC ENZYMES:No results for input(s): CKTOTAL, CKMB, CKMBINDEX, TROPONINI in the last 72 hours. FASTING LIPID PANEL:  Lab Results   Component Value Date    CHOL 169 07/15/2019    HDL 40 07/15/2019    LDLCALC 90 07/15/2019    TRIG 193 07/15/2019     LIVER PROFILE:No results for input(s): AST, ALT, LABALBU in the last 72 hours. ASSESSMENT:  1. Troponin elevation consistent with myocardial injury. EKG with anteroseptal infarct pattern. 2. Acute on chronic diastolic heart failure. TTE with mild LVH and no other significant abnormalities. 3. Hypertension  4. CKD with baseline creatinine of 1.3  5. Diabetes    PLAN:   Aspirin 81 mg daily   Increase Coreg to 12.5 mg p.o. twice daily   Continue Lasix 40 mg p.o. twice daily   Continue amlodipine, losartan, spironolactone as is  Keep potassium 4.0-5.0 and magnesium greater than 2  2 g daily sodium restriction  Accurate intake and output and daily weights   Left heart cath and coronary angiogram today. Clinically he appears euvolemic. Based on his LVEDP we may decide whether or not he is ready for discharge. a. After discharge follow-up with Dr. Krysten Moreno in 4 weeks.      We will continue to follow this patient. Thank you for the consult.     Oz Lane MD  Interventional Cardiology/Structural Heart Disease  Cell: [phone]       Electronically signed by Oz Lane MD on 10/12/2020 at 6:19 AM

## 2020-10-12 NOTE — CONSULTS
Met with patient and discussed that their physician has ordered a referral to our outpatient Phase II Cardiac Rehabilitation program. Reviewed the benefits of cardiac rehabilitation based on their diagnosis and personal risk factors. Patient demonstrates moderate interest in Cardiac Rehabilitation at this time. Cardiac Rehabilitation brochure provided to patient/family. The Cardiac Rehabilitation Program has been provided the patient's referral information and pertinent patient details and history. The patient may call [address] [place] [address] at [phone] for additional information or questions. Contact information for [address] [place] [address] and other choices close to the patient's residence have been provided in the discharge instructions so that the patient may call and schedule an appointment when cleared by their physician.  Thank you for the referral.

## 2020-10-13 ENCOUNTER — HOSPITAL ENCOUNTER (OUTPATIENT)
Dept: OTHER | Age: 60
Discharge: HOME | End: 2020-10-13

## 2020-10-13 ASSESSMENT — EJECTION FRACTION
EF_SOURCE: 2D ECHO
EF_VALUE: 60-65%

## 2020-10-13 NOTE — PROGRESS NOTES
Per earlene order placed for CHF Clinic f/u in a week. Moved visit up to 10 28 2:30pm to see Candie Almeida CNP    Keep DR Nolan End visit as set. [phone] (home)  spoke with wife. Updated on chf clinic referral/ Earlene's new visit/ keep dr Nolan End visit. No questions.    Has office # if any questions      Gianluca Ventura RN

## 2020-10-14 NOTE — ADT AUTH CERT
Heart Failure - Care Day 5 (10/11/2020) by Earl Sheffield RN         Review Status  Review Entered    Completed  10/14/2020 13:57        Criteria Review       Care Day: 5 Care Date: 10/11/2020 Level of Care:    Guideline Day 1    Clinical Status    ( ) * Clinical Indications met [G]    10/14/2020 1:57 PM EDT by Alessio Ponce      VITALS: T 97.2; P 102; RR 16; /97 PULSE OX 84 % RA THEN PLACED BACK ON 3 L NC    Medications    (X) IV diuretics    10/14/2020 1:57 PM EDT by Alessio Ponce      LASIX IV THEN CHANGED TO PO    * Milestone    Additional Notes    10/11/2020    MEDICATIONS: NORVASC PO DAILY, LIPITOR PO DAILY, COREG PO 2 TIMES DAILY, VIBRAMYCIN PO 2 TIMES PER DAY, LOVENOX SC DAILY, LASIX IV 2 TIMES DAILY THEN CHANGED PO 2 TIMES DAILY, HUMALOG SS 3 TIMES DAILY WITH MEALS AND NIGHTLY, DUONEB Q4H WA, SYNTHROID PO DAILY, COZAAR PO DAILY, MAG OX PO DAILY, GLUCOPHAGE PO 2 TIMES DAILY WITH MEALS, MYRBETRIQ PO DAILY,  KLOR CON M PO 2 TIMES DAILY WITH MEALS, ALDACTONE PO DAILY, VITAMIN D3 PO DAILY, VITAMIN E PO DAILY       LABS:    10/11/2020 05:14    Chloride: 90 (L)    CO2: 40 (H)    Creatinine: 1.3 (H)    Glucose: 143 (H)       10/11/2020 05:51    Meter Glucose: 137 (H)       10/11/2020 12:13    Meter Glucose: 190 (H)       10/11/2020 17:24    Meter Glucose: 222 (H)       10/11/2020 20:32    Meter Glucose: 180 (H)       EKG:Sinus rhythm with premature atrial complexes       * ** INTERNAL MEDICINE * *    PLAN:long discussion with patient/wife on hospital stay          Heart Failure - Care Day 4 (10/10/2020) by Earl Sheffield RN         Review Status  Review Entered    Completed  10/14/2020 13:57        Criteria Review       Care Day: 4 Care Date: 10/10/2020 Level of Care:    Guideline Day 1    Clinical Status    ( ) * Clinical Indications met [G]    10/14/2020 1:57 PM EDT by Alessio Ponce      vitals: T 97.3; P 100; RR 18; /78; PULSE  % 3 L NC    Medications    (X) IV diuretics    10/14/2020 1:57 PM EDT by Sanya Babcock      LASIX IV    * Milestone    Additional Notes    10/10/2020    MEDICATIONS: NORVASC PO DAILY, LIPITOR PO DAILY, VIBRAMYCIN PO 2 TIMES PER DAY, LOVENOX SC DAILY, LASIX IV 2 TIMES DAILY, HUMALOG SS 3 TIMES DAILY WITH MEALS AND NIGHTLY, DUONEB Q4H WA, SYNTHROID PO DAILY, COZAAR PO DAILY, MAG OX PO DAILY, GLUCOPHAGED PO 2 TIMES DAILY WITH MEALS, MYRBETRIQ PO DAILY, KLOR CON M PO 2 TIMES DAILY WITH MEALS, ALDACTONE PO DAILY, VITAMIN D3 PO DAILY, VITAMIN E PO DAILY       LABS:    10/10/2020 05:20    Chloride: 90 (L)    CO2: 43 (HH)    Creatinine: 1.4 (H)    Anion Gap: 6 (L)    Glucose: 133 (H)    Calcium: 8.4 (L)    Hemoglobin Quant: 10.8 (L)       10/10/2020 07:32    Meter Glucose: 149 (H)       10/10/2020 12:04    Meter Glucose: 171 (H)       10/10/2020 17:30    Meter Glucose: 200 (H)       10/10/2020 20:51    Meter Glucose: 164 (H)       ECHO:Normal left ventricle size and systolic function.     Ejection fraction is visually estimated at 60-65%.     Distal anteroseputum and apex are hypokinetic.     Mild concentric left ventricular hypertrophy.     Stage II diastolic dysfunction.     The left atrium is severely dilated.     Normal right ventricular size and function. TAPSE 26 mm.     Physiologic and/or trace mitral regurgitation is present.     No hemodynamically significant aortic stenosis is present.     Unable to estimate PA systolic pressure.     No evidence for hemodynamically significant pericardial effusion.     Technically difficult examination due to body habitus.  Definity echo  contrast was used to delineate endocardial borders.         ** ** CARDIOLOGY * **    Plan:    · We will change IV Lasix to p.o. 40 mg twice daily    · Spironolactone was added for heart failure with preserved ejection fraction (10/9/2020) continue losartan and amlodipine for hypertension, blood pressure still elevated.  We will add Coreg 6.5 mg p.o. twice daily. Susu Fitzgerald has resistant hypertension now on more than 3 medications.  Most likely he has uncontrolled hypertension from untreated sleep apnea    · He needs ischemic work-up preferably left heart catheterization on Monday    · Echo results were discussed with the patient has a normal LV function with a stage II diastolic dysfunction. · We will schedule him for cardiac cath in the a.m. keep him n.p.o. after midnight. · Continue rest of the current medications    · Anemia evaluation as per primary service. · He needs sleep study as an outpatient. · If cardiac cath comes back normal without any significant obstruction then he stable for discharge from cardiology standpoint. * ** INTERNAL MEDICINE * * *    PLAN:long discussion with patient on MARLEEN/OHS. He's not interested in even discussing it and gets belligerent. He does not want \" the state\"knowing that he is diagnosed with that.

## 2020-10-18 NOTE — DISCHARGE SUMMARY
tablet 81 mg Patient Discharge    furosemide (LASIX) tablet 40 mg Patient Discharge    spironolactone (ALDACTONE) tablet 25 mg Patient Discharge    amLODIPine (NORVASC) tablet 10 mg Patient Discharge    perflutren lipid microspheres (DEFINITY) injection 1.65 mg Patient Discharge    doxycycline hyclate (VIBRAMYCIN) capsule 100 mg Patient Discharge    ipratropium-albuterol (DUONEB) nebulizer solution 1 ampule Patient Discharge    dextrose 5 % solution Patient Discharge    glucagon (rDNA) injection 1 mg Patient Discharge    dextrose 50 % IV solution Patient Discharge    glucose (GLUTOSE) 40 % oral gel 15 g Patient Discharge    insulin lispro (HUMALOG) injection vial 0-3 Units Patient Discharge    insulin lispro (HUMALOG) injection vial 0-6 Units Patient Discharge    enoxaparin (LOVENOX) injection 40 mg Patient Discharge    ondansetron (ZOFRAN) injection 4 mg Patient Discharge    promethazine (PHENERGAN) tablet 12.5 mg Patient Discharge    polyethylene glycol (GLYCOLAX) packet 17 g Patient Discharge    acetaminophen (TYLENOL) suppository 650 mg Patient Discharge    vitamin E capsule 400 Units Patient Discharge    vitamin D3 (CHOLECALCIFEROL) tablet 400 Units Patient Discharge    potassium chloride (KLOR-CON M) extended release tablet 20 mEq Patient Discharge    mirabegron (MYRBETRIQ) extended release tablet 50 mg Patient Discharge    magnesium oxide (MAG-OX) tablet 400 mg Patient Discharge    losartan (COZAAR) tablet 100 mg Patient Discharge    levothyroxine (SYNTHROID) tablet 100 mcg Patient Discharge    atorvastatin (LIPITOR) tablet 10 mg Patient Discharge    metFORMIN (GLUCOPHAGE) tablet 1,000 mg Patient Discharge     No Known Allergies          Procedures: Cardiac cath    Hospital Course: Stable, pt did diuresis well and was breathing better. He tolerated his diet and was ambulating well. Cardiac cath was not impressive.     Discharge Exam:  See progress note from today    Disposition: Home in stable condition, follow up with Dr. Hi Valdez in one week, long-term prognosis is fair.     Rakesh Beltran  10/18/2020

## 2020-10-19 ENCOUNTER — HOSPITAL ENCOUNTER (OUTPATIENT)
Dept: OTHER | Age: 60
Setting detail: THERAPIES SERIES
Discharge: HOME | End: 2020-10-19
Payer: COMMERCIAL

## 2020-10-19 VITALS
OXYGEN SATURATION: 96 % | HEART RATE: 80 BPM | RESPIRATION RATE: 18 BRPM | DIASTOLIC BLOOD PRESSURE: 82 MMHG | WEIGHT: 315 LBS | BODY MASS INDEX: 44.5 KG/M2 | SYSTOLIC BLOOD PRESSURE: 132 MMHG

## 2020-10-19 LAB
ANION GAP SERPL CALCULATED.3IONS-SCNC: 9 MMOL/L (ref 7–16)
BUN BLDV-MCNC: 20 MG/DL (ref 6–20)
CALCIUM SERPL-MCNC: 9.4 MG/DL (ref 8.6–10.2)
CHLORIDE BLD-SCNC: 97 MMOL/L (ref 98–107)
CO2: 32 MMOL/L (ref 22–29)
CREAT SERPL-MCNC: 1.3 MG/DL (ref 0.7–1.2)
GFR AFRICAN AMERICAN: >60
GFR NON-AFRICAN AMERICAN: 57 ML/MIN/1.73
GLUCOSE BLD-MCNC: 104 MG/DL (ref 74–99)
POTASSIUM SERPL-SCNC: 4.4 MMOL/L (ref 3.5–5)
PRO-BNP: 992 PG/ML (ref 0–125)
SODIUM BLD-SCNC: 138 MMOL/L (ref 132–146)

## 2020-10-19 PROCEDURE — 96374 THER/PROPH/DIAG INJ IV PUSH: CPT

## 2020-10-19 PROCEDURE — 83880 ASSAY OF NATRIURETIC PEPTIDE: CPT

## 2020-10-19 PROCEDURE — 99204 OFFICE O/P NEW MOD 45 MIN: CPT

## 2020-10-19 PROCEDURE — 36415 COLL VENOUS BLD VENIPUNCTURE: CPT

## 2020-10-19 PROCEDURE — 80048 BASIC METABOLIC PNL TOTAL CA: CPT

## 2020-10-19 PROCEDURE — 6360000002 HC RX W HCPCS: Performed by: NURSE PRACTITIONER

## 2020-10-19 PROCEDURE — 2580000003 HC RX 258: Performed by: NURSE PRACTITIONER

## 2020-10-19 RX ORDER — FUROSEMIDE 10 MG/ML
40 INJECTION INTRAMUSCULAR; INTRAVENOUS ONCE
Status: COMPLETED | OUTPATIENT
Start: 2020-10-19 | End: 2020-10-19

## 2020-10-19 RX ORDER — SODIUM CHLORIDE 0.9 % (FLUSH) 0.9 %
10 SYRINGE (ML) INJECTION 2 TIMES DAILY
Status: DISCONTINUED | OUTPATIENT
Start: 2020-10-19 | End: 2020-10-20 | Stop reason: HOSPADM

## 2020-10-19 RX ADMIN — Medication 10 ML: at 13:27

## 2020-10-19 RX ADMIN — FUROSEMIDE 40 MG: 10 INJECTION, SOLUTION INTRAMUSCULAR; INTRAVENOUS at 13:27

## 2020-10-19 NOTE — PROGRESS NOTES
First visit today, CHF teaching completed, Caring For Your Heart, Zone pamphlet, low sodium diet, medication reviewed, pt and wife verbalized understanding. Breath sound clear diminished bilat. 2+ pitting edema noted BLE.   IV Lasix given

## 2020-10-28 ENCOUNTER — HOSPITAL ENCOUNTER (OUTPATIENT)
Dept: OTHER | Age: 60
Setting detail: THERAPIES SERIES
Discharge: HOME | End: 2020-10-28
Payer: COMMERCIAL

## 2020-10-28 ENCOUNTER — OFFICE VISIT (OUTPATIENT)
Dept: CARDIOLOGY CLINIC | Age: 60
End: 2020-10-28
Payer: COMMERCIAL

## 2020-10-28 VITALS
RESPIRATION RATE: 18 BRPM | SYSTOLIC BLOOD PRESSURE: 132 MMHG | OXYGEN SATURATION: 96 % | WEIGHT: 315 LBS | BODY MASS INDEX: 43.5 KG/M2 | HEART RATE: 88 BPM | DIASTOLIC BLOOD PRESSURE: 60 MMHG

## 2020-10-28 VITALS
OXYGEN SATURATION: 95 % | HEIGHT: 75 IN | BODY MASS INDEX: 39.17 KG/M2 | DIASTOLIC BLOOD PRESSURE: 60 MMHG | RESPIRATION RATE: 18 BRPM | WEIGHT: 315 LBS | SYSTOLIC BLOOD PRESSURE: 124 MMHG | HEART RATE: 81 BPM

## 2020-10-28 LAB
ANION GAP SERPL CALCULATED.3IONS-SCNC: 11 MMOL/L (ref 7–16)
BUN BLDV-MCNC: 19 MG/DL (ref 6–20)
CALCIUM SERPL-MCNC: 10.1 MG/DL (ref 8.6–10.2)
CHLORIDE BLD-SCNC: 96 MMOL/L (ref 98–107)
CO2: 30 MMOL/L (ref 22–29)
CREAT SERPL-MCNC: 1.3 MG/DL (ref 0.7–1.2)
GFR AFRICAN AMERICAN: >60
GFR NON-AFRICAN AMERICAN: 57 ML/MIN/1.73
GLUCOSE BLD-MCNC: 125 MG/DL (ref 74–99)
POTASSIUM SERPL-SCNC: 4 MMOL/L (ref 3.5–5)
PRO-BNP: 587 PG/ML (ref 0–125)
SODIUM BLD-SCNC: 137 MMOL/L (ref 132–146)
T4 FREE: 1.27 NG/DL (ref 0.93–1.7)
TSH SERPL DL<=0.05 MIU/L-ACNC: 6.2 UIU/ML (ref 0.27–4.2)

## 2020-10-28 PROCEDURE — 93000 ELECTROCARDIOGRAM COMPLETE: CPT | Performed by: INTERNAL MEDICINE

## 2020-10-28 PROCEDURE — 84439 ASSAY OF FREE THYROXINE: CPT

## 2020-10-28 PROCEDURE — 83880 ASSAY OF NATRIURETIC PEPTIDE: CPT

## 2020-10-28 PROCEDURE — 99214 OFFICE O/P EST MOD 30 MIN: CPT | Performed by: NURSE PRACTITIONER

## 2020-10-28 PROCEDURE — 80048 BASIC METABOLIC PNL TOTAL CA: CPT

## 2020-10-28 PROCEDURE — 84443 ASSAY THYROID STIM HORMONE: CPT

## 2020-10-28 PROCEDURE — 36415 COLL VENOUS BLD VENIPUNCTURE: CPT

## 2020-10-28 PROCEDURE — 99214 OFFICE O/P EST MOD 30 MIN: CPT

## 2020-10-28 RX ORDER — FUROSEMIDE 10 MG/ML
40 INJECTION INTRAMUSCULAR; INTRAVENOUS ONCE
Status: DISCONTINUED | OUTPATIENT
Start: 2020-10-28 | End: 2020-10-28

## 2020-10-28 RX ORDER — ATORVASTATIN CALCIUM 10 MG/1
TABLET, FILM COATED ORAL
Qty: 90 TABLET | Refills: 3 | Status: SHIPPED
Start: 2020-10-28 | End: 2020-11-10 | Stop reason: ALTCHOICE

## 2020-10-28 RX ORDER — CARVEDILOL 12.5 MG/1
12.5 TABLET ORAL 2 TIMES DAILY
Qty: 90 TABLET | Refills: 3 | Status: SHIPPED
Start: 2020-10-28 | End: 2020-11-10 | Stop reason: ALTCHOICE

## 2020-10-28 RX ORDER — LOSARTAN POTASSIUM 100 MG/1
100 TABLET ORAL DAILY
Qty: 90 TABLET | Refills: 3 | Status: SHIPPED
Start: 2020-10-28 | End: 2020-11-10 | Stop reason: ALTCHOICE

## 2020-10-28 RX ORDER — METOPROLOL SUCCINATE 25 MG/1
25 TABLET, EXTENDED RELEASE ORAL DAILY
Qty: 90 TABLET | Refills: 3 | Status: SHIPPED
Start: 2020-10-28 | End: 2020-11-10

## 2020-10-28 RX ORDER — POTASSIUM CHLORIDE 20 MEQ/1
20 TABLET, EXTENDED RELEASE ORAL 2 TIMES DAILY
Qty: 180 TABLET | Refills: 3 | Status: SHIPPED
Start: 2020-10-28 | End: 2021-12-16 | Stop reason: DRUGHIGH

## 2020-10-28 RX ORDER — FUROSEMIDE 40 MG/1
40 TABLET ORAL 2 TIMES DAILY
Qty: 180 TABLET | Refills: 3 | Status: SHIPPED
Start: 2020-10-28 | End: 2021-02-02 | Stop reason: DRUGHIGH

## 2020-10-28 RX ORDER — HYDRALAZINE HYDROCHLORIDE 50 MG/1
50 TABLET, FILM COATED ORAL 3 TIMES DAILY
Qty: 90 TABLET | Refills: 3 | Status: SHIPPED
Start: 2020-10-28 | End: 2020-12-08

## 2020-10-28 RX ORDER — AMLODIPINE BESYLATE 10 MG/1
10 TABLET ORAL DAILY
Qty: 90 TABLET | Refills: 3 | Status: SHIPPED
Start: 2020-10-28 | End: 2022-02-07 | Stop reason: DRUGHIGH

## 2020-10-28 RX ORDER — METOPROLOL SUCCINATE 100 MG/1
100 TABLET, EXTENDED RELEASE ORAL EVERY MORNING
Qty: 90 TABLET | Refills: 3 | Status: SHIPPED
Start: 2020-10-28 | End: 2020-11-10

## 2020-10-28 RX ORDER — SODIUM CHLORIDE 0.9 % (FLUSH) 0.9 %
10 SYRINGE (ML) INJECTION PRN
Status: DISCONTINUED | OUTPATIENT
Start: 2020-10-28 | End: 2020-10-28

## 2020-10-28 RX ORDER — SPIRONOLACTONE 25 MG/1
25 TABLET ORAL DAILY
Qty: 90 TABLET | Refills: 3 | Status: SHIPPED | OUTPATIENT
Start: 2020-10-28

## 2020-10-28 RX ORDER — ASPIRIN 81 MG/1
81 TABLET ORAL DAILY
Qty: 90 TABLET | Refills: 3 | Status: SHIPPED | OUTPATIENT
Start: 2020-10-28

## 2020-10-28 NOTE — PATIENT INSTRUCTIONS
1. Continue current cardiac medications    2. Referral for sleep study and referral to Dr. Tara Martinez     3. Continue to follow with CHF infusion clinic    4. Return visit with Dr. Marco Taylor as scheduled     5. Monitor your caloric intake and continue to watch portion control    6. Weigh yourself daily    -Stay Hydrated    -Diet should sodium restricted to 2 grams    -Again watch your daily weight trends and if you gain water weight please follow below instructions.    -If you gain 3-5 pounds in 2-3 days OR notice that you are retaining fluid in anyway just like you did before then take an extra dose of your water pill (furosemide/Lasix) every day until you lose the weight or feel better.     -If you notice that you have taken more than 3 extra doses in 1 week then please call and let us know. -If at any time you feel that you are retaining fluid, your medications are not working, or you feel ill in anyway, then please call us for either same day appointment or the next day, and for instructions. Our goal is to keep you out of the emergency room and the hospital and we have ways to do it. You just need to call us in a timely manner.     -If you become sick for other reasons, and notice that you are not urinating as much, the urine is very dark, you have significant diarrhea or vomiting, then please DO NOT take your water pill and CALL US immediately.

## 2020-10-28 NOTE — PROGRESS NOTES
capacity. He denies orthopnea, PND, nocturnal cough or hemoptysis. He denies abdominal distention or bloating, early satiety, anorexia/change in appetite, unintentional weight loss. He does lower extremity edema. He denies exertional lightheadedness. He denies palpitations, syncope or near syncope. Review of systems is negative for chest pain, pressure, discomfort. When ambulating on an incline, He does not leg claudication. History is negative for neurological symptoms including transient loss of vision, asymmetric weakness, aphasia, dysphasia, numbness, tingling. Patient Active Problem List    Diagnosis Date Noted    Acute on chronic diastolic heart failure (HCC)     Fluid overload 10/08/2020    Congest[facility] failure ([facility]     Localized edema due to fluid overload 10/07/2020    Type 2 diabetes mellitus without complication, without long-term current use of insulin ([facility] [address] 09/26/2019    Class 3 severe obesity due to excess calories without serious comorbidity with body mass index (BMI) of 45.0 to 49.9 in adult [facility]) 09/26/2019    Essential hypertension 09/26/2019    Hyperglycemia 09/26/2019    Acquired hypothyroidism 02/29/2016    CRI (chronic renal insufficiency) 10/52/8949    Diastolic dysfunction 70/63/6303     Echo 2-16 stage 3      Accelerated hypertension 02/18/2016     Past Medical/surgical history:    1. HFpEF  2. Echocardiogram 2/19/2016 Rachael Mahan):  Normal left ventricle size and systolic function EF 36%,   Moderate concentric left ventricular hypertrophy   Moderately dilated left atrium  Moderately dilated right  Ventricle  Borderline reduced right ventricle systolic function  Markedly  enlarged right atrium size.  Mild mitral regurgitation  Moderate tricuspid regurgitation.  RVSP is 55 mmHg.  Stage III diastolic dysfunction  3.  [place] [facility] nuclear medicine stress test 2/28/2016: Small fixed perfusion defect over the apical left ventricular myocardium with hypokinetic wall motion suggestive of scarring, no reversible defect noted. EF calculated at 55%  4. Hypertension  5. Hypothyroidism  6. Chronic kidney disease  7. Non-insulin-requiring type 2 diabetes mellitus, A1c 7.7 5/22/2020  8. Lifelong non-smoker  9. TTE (10/10/2020, Dr. Yadira Acosta) Summary:  Normal left ventricle size and systolic function. Ejection fraction is visually estimated at 60-65%. Distal anteroseputum and apex are hypokinetic. Mild concentric left ventricular hypertrophy. Stage II diastolic dysfunction. The left atrium is severely dilated. Normal right ventricular size and function. TAPSE 26 mm. Physiologic and/or trace mitral regurgitation is present. No hemodynamically significant aortic stenosis is present. Unable to estimate PA systolic pressure. No evidence for hemodynamically significant pericardial effusion. Technically difficult examination due to body habitus. Definity echo  contrast was used to delineate endocardial borders. 10. [facility] (10/12/2020, Dr. Bentley Hardy) Coronary anatomy: Dominance: Right. Left main: Intragraphically normal. LAD: Gives off a large first diagonal and is followed by a chronic total occlusion in the midsegment. The occlusion appears to be short, nontortuous, not heavily calcified, with a very good quality distal vessel based on collateral circulation from the RCA. This is consistent with J- score of 1 due to the blunt stump. Moderate-sized ramus intermedius with mild diffuse disease. Left circumflex: Gives off a dominant OM. Mild diffuse disease. RCA: Large dominant vessel with large RPDA and large RPL. The RPDA gives prominent septal collaterals to the LAD. The RPL appears to give faint epicardial collaterals to a diagonal. Hemodynamics: LVEDP 26 Ao: 144/87 with a mean of 112. Conclusions: Mid LAD  with prominent right to left septal collaterals from the RPDA. J- score 1 for blunt stump Otherwise mild diffuse CAD. Elevated left filling pressure. PLAN (per Dr. Bentley Hardy):  The LAD distribution appears viable on resting TTE. Given his young age I recommend LAD revascularization.  PCI of the LAD appears feasible with features generally consistent with high success rate. I am happy to see him in consult regarding that. Past Medical History:   Diagnosis Date    [facility]) ([facility])     CKD (chronic kidney disease)     DM (diabetes mellitus) ([facility] [address]     Hypertension     Hypothyroidism        History reviewed. No pertinent surgical history. No Known Allergies      Outpatient Medications Marked as Taking for the 10/28/20 encounter (Office Visit) with BRITTNEY Faustin - CNP   Medication Sig Dispense Refill    aspirin EC 81 MG EC tablet Take 1 tablet by mouth daily 90 tablet 3    amLODIPine (NORVASC) 10 MG tablet Take 1 tablet by mouth daily 90 tablet 3    atorvastatin (LIPITOR) 10 MG tablet Take one by mouth on Mondays, Wednesdays, and Fridays 90 tablet 3    carvedilol (COREG) 12.5 MG tablet Take 1 tablet by mouth 2 times daily 90 tablet 3    furosemide (LASIX) 40 MG tablet Take 1 tablet by mouth 2 times daily 180 tablet 3    losartan (COZAAR) 100 MG tablet Take 1 tablet by mouth daily 90 tablet 3    spironolactone (ALDACTONE) 25 MG tablet Take 1 tablet by mouth daily 90 tablet 3    hydrALAZINE (APRESOLINE) 50 MG tablet Take 1 tablet by mouth 3 times daily 90 tablet 3    metoprolol succinate (TOPROL XL) 100 MG extended release tablet Take 1 tablet by mouth every morning 90 tablet 3    metoprolol succinate (TOPROL XL) 25 MG extended release tablet Take 1 tablet by mouth daily Take one daily 90 tablet 3    potassium chloride (KLOR-CON M20) 20 MEQ extended release tablet Take 1 tablet by mouth 2 times daily TAKE 1 TABLET BY MOUTH TWICE A  tablet 3    nitroGLYCERIN (NITROSTAT) 0.4 MG SL tablet up to max of 3 total doses.  If no relief after 1 dose, call 911. 25 tablet 3    metFORMIN (GLUCOPHAGE) 1000 MG tablet Take 1 tablet by mouth 2 times daily (with meals) 180 tablet 1    levothyroxine (SYNTHROID) 100 MCG tablet Take 1 tablet by mouth Daily (Patient taking differently: Take 137 mcg by mouth Daily ) 90 tablet 0    vitamin D3 (CHOLECALCIFEROL) 400 units TABS tablet Take 400 Units by mouth daily      vitamin E 400 UNIT capsule Take 400 Units by mouth daily      magnesium oxide (MAG-OX) 400 MG tablet TAKE 1 TABLET BY MOUTH DAILY 90 tablet 1    Multiple Vitamins-Minerals (THERAPEUTIC MULTIVITAMIN-MINERALS) tablet Take 1 tablet by mouth daily           Review of Systems:   Cardiac: As per HPI  General: No fever, chills, rigors  Pulmonary: As per HPI  HEENT: No visual disturbances, difficult swallowing  GI: No nausea, vomiting, abdominal pain  : No dysuria or hematuria  Endocrine: No thyroid disease or diabetes  Musculoskeletal: RAINES x 4, no focal motor deficits  Skin: Intact, no rashes  Neuro/Psych: No headache or seizures      Weights: Wt Readings from Last 3 Encounters:   10/28/20 (!) 348 lb (157.9 kg)   10/28/20 (!) 348 lb (157.9 kg)   10/19/20 (!) 356 lb (161.5 kg)         Physical Examination:     /60 (Site: Left Upper Arm, Position: Sitting, Cuff Size: Large Adult)   Pulse 81   Resp 18   Ht 6' 3\" (1.905 m)   Wt (!) 348 lb (157.9 kg)   SpO2 95%   BMI 43.50 kg/m²     CONSTITUTIONAL: Alert and oriented times 3, no acute distress and cooperative to examination with proper mood and affect. SKIN: Skin color, texture, turgor normal. No rashes or lesions. LYMPH: no cervical nodes, no inguinal nodes  HEENT: Head is normocephalic, atraumatic. EOMI, PERRLA. NECK: Supple, symmetrical, trachea midline, no adenopathy, thyroid symmetric, not enlarged and no tenderness, skin normal. Mild jvd/hjr. CHEST/LUNGS: chest symmetric with normal A/P diameter, normal respiratory rate and rhythm, lungs clear to auscultation without wheezes, rales or rhonchi. No accessory muscle use.  Scars None   CARDIOVASCULAR: Heart sounds are normal.  Regular rate and rhythm without murmur, gallop or rub. Normal S1 and S2. Carotid and femoral pulses 2+/4 and equal bilaterally. ABDOMEN: Morbid obesity. No and Laparoscopic scar(s) present. Normal bowel sounds. No bruits. soft, nondistended, no masses or organomegaly. no evidence of hernia. Percussion: Normal without hepatosplenomegally. Tenderness: absent. RECTAL: deferred, not clinically indicated  NEUROLOGIC: There are no focalizing motor or sensory deficits. CN II-XII are grossly intact. EXTREMITIES: no cyanosis, no clubbing. 2-3+ bilateral lower extremity edema. Warm and well perfused. All the following diagnostics were personally reviewed and interpreted by me. LAB DATA:     10/19/2020 13:15 10/28/2020 12:50   Sodium 138 137   Potassium 4.4 4.0   Chloride 97 (L) 96 (L)   CO2 32 (H) 30 (H)   BUN 20 19   Creatinine 1.3 (H) 1.3 (H)   Anion Gap 9 11   GFR Non- 57 57   GFR African American >60 >60   Glucose 104 (H) 125 (H)   Calcium 9.4 10.1   Pro- (H) 587 (H)       IMAGING:    CXR (10/7/2020)  Impression   1.  Interstitial prominence in perihilar locations could suggest   peribronchial inflammatory changes or mild pulmonary vascular congestion. 2.  No focal airspace opacity or pleural effusion. CARDIAC TESTING:    TTE (10/10/2020, Dr. Vianey Lentz)   Summary:   Normal left ventricle size and systolic function. Ejection fraction is visually estimated at 60-65%. Distal anteroseputum and apex are hypokinetic. Mild concentric left ventricular hypertrophy. Stage II diastolic dysfunction. The left atrium is severely dilated. Normal right ventricular size and function. TAPSE 26 mm. Physiologic and/or trace mitral regurgitation is present. No hemodynamically significant aortic stenosis is present. Unable to estimate PA systolic pressure. No evidence for hemodynamically significant pericardial effusion. Technically difficult examination due to body habitus.  Definity echo contrast was used to delineate endocardial borders. [facility] (10/12/2020, Dr. Rodolfo Garibay)  Coronary anatomy:  Dominance: Right  1. Left main: Intragraphically normal  2. LAD: Gives off a large first diagonal and is followed by a chronic total occlusion in the midsegment. The occlusion appears to be short, nontortuous, not heavily calcified, with a very good quality distal vessel based on collateral circulation from the RCA. This is consistent with J- score of 1 due to the blunt stump. 3. Moderate-sized ramus intermedius with mild diffuse disease  4. Left circumflex: Gives off a dominant OM. Mild diffuse disease  5. RCA: Large dominant vessel with large RPDA and large RPL. The RPDA gives prominent septal collaterals to the LAD. The RPL appears to give faint epicardial collaterals to a diagonal.  Hemodynamics:  LVEDP 26  Ao: 144/87 with a mean of 112  Conclusions:  1. Mid LAD  with prominent right to left septal collaterals from the RPDA. J- score 1 for blunt stump  2. Otherwise mild diffuse CAD  3. Elevated left filling pressure  PLAN (per Dr. Rodolfo Garibay):  1. The LAD distribution appears viable on resting TTE. Given his young age I recommend LAD revascularization.  PCI of the LAD appears feasible with features generally consistent with high success rate. I am happy to see him in consult regarding that. EKG  Sinus Rhythm  First degree A-V block       ASSESSMENT:  1. Acute on chronic HFpEF  2. ACC stage C / NYHA class III  3. Hypervolemic however improving with current diuretic regimen  4. CAD with  mid LAD otherwise mild diffuse disease. Following with Dr. Rodolfo Garibay and plans for revascularization of LAD. 5. HTN  6. CKD  7. T2DM  8. Hypothyroidism  9. Morbid obesity  10. MARLEEN      PLAN:  1. Continue current cardiac medications    2. Referral for sleep study and referral to Dr. Ashley Holden     3. Continue to follow with CHF infusion clinic weekly    4. Return visit with Dr. Rodolfo Garibay as scheduled     5.  Monitor your caloric intake and continue to watch portion control    6. Weigh yourself daily    -Stay Hydrated    -Diet should sodium restricted to 2 grams    -Again watch your daily weight trends and if you gain water weight please follow below instructions.    -If you gain 3-5 pounds in 2-3 days OR notice that you are retaining fluid in anyway just like you did before then take an extra dose of your water pill (furosemide/Lasix) every day until you lose the weight or feel better.     -If you notice that you have taken more than 3 extra doses in 1 week then please call and let us know. -If at any time you feel that you are retaining fluid, your medications are not working, or you feel ill in anyway, then please call us for either same day appointment or the next day, and for instructions. Our goal is to keep you out of the emergency room and the hospital and we have ways to do it. You just need to call us in a timely manner.     -If you become sick for other reasons, and notice that you are not urinating as much, the urine is very dark, you have significant diarrhea or vomiting, then please DO NOT take your water pill and CALL US immediately.     Mariana Beltran APRN-CNP  [id number] [facility]

## 2020-10-28 NOTE — PROGRESS NOTES
Weight down 8 lbs from last week. Only complaint is feeling tired. Assessment basically unchanged from last week, except BLE edema is lessened. Labs obtained. Stephany Dunne

## 2020-10-30 ENCOUNTER — TELEPHONE (OUTPATIENT)
Dept: CARDIOLOGY CLINIC | Age: 60
End: 2020-10-30

## 2020-10-30 NOTE — TELEPHONE ENCOUNTER
Wife called requested notes be sent to [facility] for upcoming apt     Faxed [place] [facility] cardiology notes to     11/04/2020 Office Visit [facility]    [address] [facility], [address] [facility]   [phone] (Fax)        [facility] and [facility]  (EMR) notes available in EMR also. Self referred to [facility] for 2nd option.

## 2020-11-02 ENCOUNTER — HOSPITAL ENCOUNTER (OUTPATIENT)
Dept: OTHER | Age: 60
Setting detail: THERAPIES SERIES
Discharge: HOME | End: 2020-11-02
Payer: COMMERCIAL

## 2020-11-02 VITALS
BODY MASS INDEX: 43 KG/M2 | WEIGHT: 315 LBS | RESPIRATION RATE: 18 BRPM | DIASTOLIC BLOOD PRESSURE: 83 MMHG | SYSTOLIC BLOOD PRESSURE: 147 MMHG | HEART RATE: 84 BPM

## 2020-11-02 LAB
ANION GAP SERPL CALCULATED.3IONS-SCNC: 10 MMOL/L (ref 7–16)
BUN BLDV-MCNC: 17 MG/DL (ref 6–20)
CALCIUM SERPL-MCNC: 9.6 MG/DL (ref 8.6–10.2)
CHLORIDE BLD-SCNC: 99 MMOL/L (ref 98–107)
CO2: 28 MMOL/L (ref 22–29)
CREAT SERPL-MCNC: 1.2 MG/DL (ref 0.7–1.2)
GFR AFRICAN AMERICAN: >60
GFR NON-AFRICAN AMERICAN: >60 ML/MIN/1.73
GLUCOSE BLD-MCNC: 134 MG/DL (ref 74–99)
POTASSIUM SERPL-SCNC: 4.2 MMOL/L (ref 3.5–5)
PRO-BNP: 730 PG/ML (ref 0–125)
SODIUM BLD-SCNC: 137 MMOL/L (ref 132–146)

## 2020-11-02 PROCEDURE — 36415 COLL VENOUS BLD VENIPUNCTURE: CPT

## 2020-11-02 PROCEDURE — 80048 BASIC METABOLIC PNL TOTAL CA: CPT

## 2020-11-02 PROCEDURE — 2580000003 HC RX 258: Performed by: INTERNAL MEDICINE

## 2020-11-02 PROCEDURE — 96374 THER/PROPH/DIAG INJ IV PUSH: CPT

## 2020-11-02 PROCEDURE — 83880 ASSAY OF NATRIURETIC PEPTIDE: CPT

## 2020-11-02 PROCEDURE — 6360000002 HC RX W HCPCS: Performed by: INTERNAL MEDICINE

## 2020-11-02 PROCEDURE — 99214 OFFICE O/P EST MOD 30 MIN: CPT

## 2020-11-02 RX ORDER — FUROSEMIDE 10 MG/ML
40 INJECTION INTRAMUSCULAR; INTRAVENOUS ONCE
Status: COMPLETED | OUTPATIENT
Start: 2020-11-02 | End: 2020-11-02

## 2020-11-02 RX ORDER — SODIUM CHLORIDE 0.9 % (FLUSH) 0.9 %
10 SYRINGE (ML) INJECTION 2 TIMES DAILY
Status: DISCONTINUED | OUTPATIENT
Start: 2020-11-02 | End: 2020-11-03 | Stop reason: HOSPADM

## 2020-11-02 RX ADMIN — Medication 10 ML: at 08:43

## 2020-11-02 RX ADMIN — FUROSEMIDE 40 MG: 10 INJECTION, SOLUTION INTRAMUSCULAR; INTRAVENOUS at 08:43

## 2020-11-03 PROBLEM — I10 ESSENTIAL HYPERTENSION: Status: RESOLVED | Noted: 2019-09-26 | Resolved: 2020-11-03

## 2020-11-10 ENCOUNTER — HOSPITAL ENCOUNTER (OUTPATIENT)
Dept: OTHER | Age: 60
Setting detail: THERAPIES SERIES
Discharge: HOME | End: 2020-11-10
Payer: COMMERCIAL

## 2020-11-10 ENCOUNTER — OFFICE VISIT (OUTPATIENT)
Dept: CARDIOLOGY CLINIC | Age: 60
End: 2020-11-10
Payer: COMMERCIAL

## 2020-11-10 VITALS
RESPIRATION RATE: 18 BRPM | WEIGHT: 315 LBS | DIASTOLIC BLOOD PRESSURE: 53 MMHG | HEART RATE: 84 BPM | SYSTOLIC BLOOD PRESSURE: 120 MMHG | BODY MASS INDEX: 42.12 KG/M2

## 2020-11-10 VITALS
HEART RATE: 82 BPM | WEIGHT: 315 LBS | SYSTOLIC BLOOD PRESSURE: 112 MMHG | BODY MASS INDEX: 39.17 KG/M2 | HEIGHT: 75 IN | RESPIRATION RATE: 18 BRPM | DIASTOLIC BLOOD PRESSURE: 76 MMHG

## 2020-11-10 LAB
ANION GAP SERPL CALCULATED.3IONS-SCNC: 13 MMOL/L (ref 7–16)
BUN BLDV-MCNC: 23 MG/DL (ref 6–20)
CALCIUM SERPL-MCNC: 9.5 MG/DL (ref 8.6–10.2)
CHLORIDE BLD-SCNC: 100 MMOL/L (ref 98–107)
CO2: 24 MMOL/L (ref 22–29)
CREAT SERPL-MCNC: 1.2 MG/DL (ref 0.7–1.2)
GFR AFRICAN AMERICAN: >60
GFR NON-AFRICAN AMERICAN: >60 ML/MIN/1.73
GLUCOSE BLD-MCNC: 116 MG/DL (ref 74–99)
POTASSIUM SERPL-SCNC: 4.3 MMOL/L (ref 3.5–5)
PRO-BNP: 453 PG/ML (ref 0–125)
SODIUM BLD-SCNC: 137 MMOL/L (ref 132–146)

## 2020-11-10 PROCEDURE — 36415 COLL VENOUS BLD VENIPUNCTURE: CPT

## 2020-11-10 PROCEDURE — 99215 OFFICE O/P EST HI 40 MIN: CPT | Performed by: INTERNAL MEDICINE

## 2020-11-10 PROCEDURE — 96374 THER/PROPH/DIAG INJ IV PUSH: CPT

## 2020-11-10 PROCEDURE — 80048 BASIC METABOLIC PNL TOTAL CA: CPT

## 2020-11-10 PROCEDURE — 99214 OFFICE O/P EST MOD 30 MIN: CPT

## 2020-11-10 PROCEDURE — 93000 ELECTROCARDIOGRAM COMPLETE: CPT | Performed by: INTERNAL MEDICINE

## 2020-11-10 PROCEDURE — 83880 ASSAY OF NATRIURETIC PEPTIDE: CPT

## 2020-11-10 RX ORDER — HYDRALAZINE HYDROCHLORIDE 25 MG/1
TABLET, FILM COATED ORAL
COMMUNITY
Start: 2020-08-23 | End: 2020-11-10

## 2020-11-10 RX ORDER — VALSARTAN 320 MG/1
320 TABLET ORAL DAILY
COMMUNITY
Start: 2020-11-04 | End: 2020-12-08 | Stop reason: ALTCHOICE

## 2020-11-10 RX ORDER — CARVEDILOL 25 MG/1
25 TABLET ORAL 2 TIMES DAILY WITH MEALS
COMMUNITY

## 2020-11-10 RX ORDER — SODIUM CHLORIDE 0.9 % (FLUSH) 0.9 %
10 SYRINGE (ML) INJECTION PRN
Status: DISCONTINUED | OUTPATIENT
Start: 2020-11-10 | End: 2020-11-11 | Stop reason: HOSPADM

## 2020-11-10 RX ORDER — ATORVASTATIN CALCIUM 40 MG/1
40 TABLET, FILM COATED ORAL DAILY
COMMUNITY
End: 2022-01-25 | Stop reason: SDUPTHER

## 2020-11-10 RX ORDER — FUROSEMIDE 10 MG/ML
40 INJECTION INTRAMUSCULAR; INTRAVENOUS ONCE
Status: DISCONTINUED | OUTPATIENT
Start: 2020-11-10 | End: 2020-11-11 | Stop reason: HOSPADM

## 2020-11-10 NOTE — PROGRESS NOTES
Weight down 7 lbs from last visit. States feeling much better. BLE edema just trace. Lungs clear. Labs only checked.

## 2020-11-10 NOTE — PROGRESS NOTES
[address] [facility]   Heart and Vascular [place]   Clinic Note     Date:11/10/20   Patient Eun Birmingham  YOB: 1960  Age: 64 y.o. Primary Care Provider: Meggan Garnett MD    Subjective     This is a 53-year-old  male who returns for post hospital follow-up accompanied by his wife. I saw him in the hospital in the setting of acute on chronic diastolic heart failure with myocardial injury. He underwent a coronary angiogram revealing a mid LAD . Since then he has followed up with Murali Choi NP, and our CHF clinic. He has lost a significant amount of weight and has improved symptomatically significantly. He continues to take Lasix 40 mg p.o. twice daily and has recently been able to sleep flat for the first time and has not had any significant lower extremity edema orthopnea or PND. He has not had any chest pain. Is not having syncope or presyncope or palpitations. He went to [facility] for second opinion regarding LAD  PCI which I recommended given his preserved LV systolic function and the high likelihood of procedural success. They were in agreement at [facility] and he is being worked up for CMR to verify the viability of the LAD distribution with plans for  PCI. The patient would like to continue his cardiac follow-up here while undergoing the  PCI at [facility]. A focused history review includes:  1. Chronic diastolic heart failure with acute decompensation in October 2020  2. Mild diffuse CAD with mid LAD  (J- score of 1)-coronary angiogram 10/12/2020 (Dr. [facility]). 3. Hypertension  4. CKD with baseline creatinine 1.2-1.3  5. Diabetes  6.           Past History    Past Medical History:         Diagnosis Date    [facility]     CKD (chronic kidney disease)     DM [facility]di[facility] mellitus) ([facility] [address]     Hypertension     Hypothyroidism           Social History:    Social History     Tobacco History     Smoking Status  Never Smoker    Smokeless Tobacco Use  Never Used          Alcohol History     Alcohol Use Status  Not Currently Drinks/Week  0 Standard drinks or equivalent per week Amount  0.0 standard drinks of alcohol/wk          Drug Use     Drug Use Status  Never          Sexual Activity     Sexually Active  Not Asked                    Family History:       Problem Relation Age of Onset    Arthritis Mother     COPD Mother     Hypertension Mother     Colon Cancer Mother     Uterine Cancer Mother     Diabetes Father     Stroke Father     Coronary Art Dis Father         s/p CABG x3    Hypertension Father     Hypertension Sister          Review of Systems   General ROS: No weight loss fevers or chills  Psychological ROS: No new depression or anxiety or altered mood  Ophthalmic ROS: No new vision changes or diplopia  Respiratory ROS: No cough, wheezing, shortness of breath, or hemoptysis  Cardiovascular ROS: See HPI  Gastrointestinal ROS: No nausea, vomiting, constipation, diarrhea, hematemesis, melena, or hematochezia  Genito-Urinary ROS: No dysuria, hematuria, or new incontinence  Musculoskeletal ROS: No new muscle pain, joint pain, joint swelling, or back pain  Neurological ROS: No new numbness or paresthesias, no focal weakness, no altered speech, no new memory loss  Dermatological ROS: No new rashes, no pruritus, no skin masses        Medications     Current Outpatient Medications   Medication Sig Dispense Refill    atorvastatin (LIPITOR) 40 MG tablet Take 40 mg by mouth daily      carvedilol (COREG) 25 MG tablet Take 25 mg by mouth 2 times daily (with meals)      valsartan (DIOVAN) 320 MG tablet Take 320 mg by mouth daily      aspirin EC 81 MG EC tablet Take 1 tablet by mouth daily 90 tablet 3    amLODIPine (NORVASC) 10 MG tablet Take 1 tablet by mouth daily 90 tablet 3    furosemide (LASIX) 40 MG tablet Take 1 tablet by mouth 2 times daily 180 tablet 3    spironolactone (ALDACTONE) 25 MG tablet Take 1 tablet by mouth daily 90 tablet 3    hydrALAZINE (APRESOLINE) 50 MG tablet Take 1 tablet by mouth 3 times daily 90 tablet 3    potassium chloride (KLOR-CON M20) 20 MEQ extended release tablet Take 1 tablet by mouth 2 times daily TAKE 1 TABLET BY MOUTH TWICE A  tablet 3    nitroGLYCERIN (NITROSTAT) 0.4 MG SL tablet up to max of 3 total doses. If no relief after 1 dose, call 911. 25 tablet 3    metFORMIN (GLUCOPHAGE) 1000 MG tablet Take 1 tablet by mouth 2 times daily (with meals) 180 tablet 1    levothyroxine (SYNTHROID) 100 MCG tablet Take 1 tablet by mouth Daily (Patient taking differently: Take 150 mcg by mouth Daily ) 90 tablet 0    vitamin D3 (CHOLECALCIFEROL) 400 units TABS tablet Take 400 Units by mouth daily      vitamin E 400 UNIT capsule Take 400 Units by mouth daily      magnesium oxide (MAG-OX) 400 MG tablet TAKE 1 TABLET BY MOUTH DAILY 90 tablet 1    Multiple Vitamins-Minerals (THERAPEUTIC MULTIVITAMIN-MINERALS) tablet Take 1 tablet by mouth daily       No current facility-administered medications for this visit.       Facility-Administered Medications Ordered in Other Visits   Medication Dose Route Frequency Provider Last Rate Last Dose    sodium chloride flush 0.9 % injection 10 mL  10 mL Intravenous PRN Ada Segura MD        furosemide (LASIX) injection 40 mg  40 mg Intravenous Once Ada Segura MD               Physical Examination      /76   Pulse 82   Resp 18   Ht 6' 3\" (1.905 m)   Wt (!) 341 lb (154.7 kg)   BMI 42.62 kg/m²     General: No acute distress, appears as stated age, nonicteric  Head: Atraumatic, no gross abnormalities or bruises  Neck: Supple and nontender, no carotid bruits, JVP mildly elevated  Lungs: Clear to auscultation bilaterally, no wheezes, rales, or rhonchi  Heart: Regular rate and rhythm, no murmurs, rubs, or gallops  Abdomen: Soft, nontender, nondistended, normal bowel sounds  Extremities: No obvious deformities, no cyanosis, trace pitting edema  Neurological: Technically difficult due to body habitus        Assessment and Plan: This is a 70-year-old  male with a history noted above. Symptomatically he has no angina. He has no symptoms of heart failure although objectively his jugular venous pressure remains elevated and he has trace pitting edema. He has no symptoms of arrhythmia. Diagnosis Orders   1. Chronic diastolic heart failure ([address]     2. Essential hypertension     3. Coronary artery disease involving native coronary artery of native heart without angina pectoris        · Continue diuresis with Lasix 40 mg p.o. twice daily. On this regimen he has been losing about 1 pound a day. I instructed him to follow-up in the CHF clinic in 2 weeks with BMP and BNP  · Continue hydralazine 50 mg 3 times daily  · Continue amlodipine 10 mg daily  · Continue valsartan 320 mg daily  · Continue Coreg 25 mg p.o. twice daily  · Continue atorvastatin 40 mg daily  · CMR scheduled next week at [facility] to be followed by  PCI by Dr. Delmy Winters at [address] Follow up with me in 6 months. Sooner if needed    We appreciate the opportunity to participate in His care!       Libia Kirby MD  Interventional Cardiology/Structural Heart Disease  Office: [phone]  Fax: [phone]  [place] Coordinator: Bella Meeks

## 2020-12-08 ENCOUNTER — OFFICE VISIT (OUTPATIENT)
Dept: CARDIOLOGY CLINIC | Age: 60
End: 2020-12-08
Payer: COMMERCIAL

## 2020-12-08 VITALS
BODY MASS INDEX: 39.17 KG/M2 | DIASTOLIC BLOOD PRESSURE: 80 MMHG | HEART RATE: 80 BPM | SYSTOLIC BLOOD PRESSURE: 132 MMHG | HEIGHT: 75 IN | WEIGHT: 315 LBS | RESPIRATION RATE: 16 BRPM

## 2020-12-08 PROBLEM — I50.22 CHRONIC SYSTOLIC HEART FAILURE (HCC): Status: ACTIVE | Noted: 2020-12-08

## 2020-12-08 PROBLEM — I25.10 CORONARY ARTERY DISEASE INVOLVING NATIVE CORONARY ARTERY OF NATIVE HEART WITHOUT ANGINA PECTORIS: Status: ACTIVE | Noted: 2020-12-08

## 2020-12-08 PROCEDURE — 99215 OFFICE O/P EST HI 40 MIN: CPT | Performed by: INTERNAL MEDICINE

## 2020-12-08 RX ORDER — SACUBITRIL AND VALSARTAN 49; 51 MG/1; MG/1
1 TABLET, FILM COATED ORAL 2 TIMES DAILY
Qty: 60 TABLET | Refills: 1 | Status: SHIPPED
Start: 2020-12-08 | End: 2021-02-15

## 2020-12-08 RX ORDER — HYDRALAZINE HYDROCHLORIDE 50 MG/1
25 TABLET, FILM COATED ORAL 3 TIMES DAILY
Qty: 1 TABLET | Refills: 0
Start: 2020-12-08 | End: 2021-01-28

## 2020-12-08 NOTE — PROGRESS NOTES
[address] [facility]   Heart and Vascular [place]   Clinic Note     Date:12/8/20   Patient Jessie Rosas  YOB: 1960  Age: 64 y.o. Primary Care Provider: Haven Pineda MD    Subjective     This is a 56-year-old  male who returns for post hospital follow-up accompanied by his wife. I saw him in the hospital in the setting of acute on chronic diastolic heart failure with myocardial injury. He underwent a coronary angiogram revealing a mid LAD . Since then he has followed up with Sue Park NP, and our CHF clinic. He has lost a significant amount of weight and has improved symptomatically significantly. He continues to take Lasix 40 mg p.o. twice daily and has recently been able to sleep flat for the first time and has not had any significant lower extremity edema orthopnea or PND. He has not had any chest pain. Is not having syncope or presyncope or palpitations. He went to [facility] for second opinion regarding LAD  PCI which I recommended given his preserved LV systolic function and the high likelihood of procedural success. They were in agreement at [facility] and he is being worked up for CMR to verify the viability of the LAD distribution with plans for  PCI. The patient would like to continue his cardiac follow-up here while undergoing the  PCI at [facility]. Since his last visit here he underwent the CMR at [facility] which revealed nonviable myocardium in the mid to distal LAD distribution.  PCI was canceled as he has no angina. He has been feeling very well and continues to report improved dyspnea and only trace lower extremity edema. He has no orthopnea and he is sleeping well. He has no chest pain, syncope, presyncope, palpitations. His hydralazine was recently decreased to 25 mg 3 times daily which resulted in his blood pressure being in the 660U systolic at home.   He is very compliant with his medications    A focused history review includes:  1. Chronic diastolic heart failure with acute decompensation in October 2020  2. Mild diffuse CAD with mid LAD  (J- score of 1)-coronary angiogram 10/12/2020 (Dr. Zuly Coto). 1. CMR at [facility] dated 11/16/2020: LVEF 44% and dilated, normal RV size and systolic function, mild MR. There is mid to apical anterior, septal, lateral and inferior delayed enhancement in a near transmural pattern consistent with scar  3. Hypertension  4. CKD with baseline creatinine 1.2-1.3  5.  Diabetes       Past History    Past Medical History:         Diagnosis Date    [facility]     CKD (chronic kidney disease)     DM (diabetes mellitus) ([facility] [address]     Hypertension     Hypothyroidism     Thyroid nodule 11/07/2020          Social History:    Social History     Tobacco History     Smoking Status  Never Smoker    Smokeless Tobacco Use  Never Used          Alcohol History     Alcohol Use Status  Not Currently Drinks/Week  0 Standard drinks or equivalent per week Amount  0.0 standard drinks of alcohol/wk          Drug Use     Drug Use Status  Never          Sexual Activity     Sexually Active  Not Asked                    Family History:       Problem Relation Age of Onset    Arthritis Mother    Dheeraj Estrada COPD Mother     Hypertension Mother     Colon Cancer Mother     Uterine Cancer Mother     Diabetes Father     Stroke Father     Coronary Art Dis Father         s/p CABG x3    Hypertension Father     Hypertension Sister          Review of Systems   General ROS: No weight loss fevers or chills  Psychological ROS: No new depression or anxiety or altered mood  Ophthalmic ROS: No new vision changes or diplopia  Respiratory ROS: No cough, wheezing, shortness of breath, or hemoptysis  Cardiovascular ROS: See HPI  Gastrointestinal ROS: No nausea, vomiting, constipation, diarrhea, hematemesis, melena, or hematochezia  Genito-Urinary ROS: No dysuria, hematuria, or new incontinence  Musculoskeletal ROS: No new muscle pain, joint pain, joint swelling, or back pain  Neurological ROS: No new numbness or paresthesias, no focal weakness, no altered speech, no new memory loss  Dermatological ROS: No new rashes, no pruritus, no skin masses        Medications     Current Outpatient Medications   Medication Sig Dispense Refill    sacubitril-valsartan (ENTRESTO) 49-51 MG per tablet Take 1 tablet by mouth 2 times daily 60 tablet 1    hydrALAZINE (APRESOLINE) 50 MG tablet Take 0.5 tablets by mouth 3 times daily 1 tablet 0    atorvastatin (LIPITOR) 40 MG tablet Take 40 mg by mouth daily      carvedilol (COREG) 25 MG tablet Take 25 mg by mouth 2 times daily (with meals)      aspirin EC 81 MG EC tablet Take 1 tablet by mouth daily 90 tablet 3    amLODIPine (NORVASC) 10 MG tablet Take 1 tablet by mouth daily 90 tablet 3    furosemide (LASIX) 40 MG tablet Take 1 tablet by mouth 2 times daily 180 tablet 3    spironolactone (ALDACTONE) 25 MG tablet Take 1 tablet by mouth daily 90 tablet 3    potassium chloride (KLOR-CON M20) 20 MEQ extended release tablet Take 1 tablet by mouth 2 times daily TAKE 1 TABLET BY MOUTH TWICE A  tablet 3    nitroGLYCERIN (NITROSTAT) 0.4 MG SL tablet up to max of 3 total doses. If no relief after 1 dose, call 911. 25 tablet 3    metFORMIN (GLUCOPHAGE) 1000 MG tablet Take 1 tablet by mouth 2 times daily (with meals) 180 tablet 1    levothyroxine (SYNTHROID) 100 MCG tablet Take 1 tablet by mouth Daily (Patient taking differently: Take 150 mcg by mouth Daily ) 90 tablet 0    vitamin D3 (CHOLECALCIFEROL) 400 units TABS tablet Take 400 Units by mouth daily      vitamin E 400 UNIT capsule Take 400 Units by mouth daily      magnesium oxide (MAG-OX) 400 MG tablet TAKE 1 TABLET BY MOUTH DAILY 90 tablet 1    Multiple Vitamins-Minerals (THERAPEUTIC MULTIVITAMIN-MINERALS) tablet Take 1 tablet by mouth daily       No current facility-administered medications for this visit.             Physical LABVLDL 39 07/15/2019    LABVLDL 17 06/08/2018    LABVLDL 51 06/01/2017     No results found for: [facility]    Lab Results   Component Value Date    TROPONINI 0.15 (H) 10/08/2020       No results found for: BNP      Lab Results   Component Value Date    LABA1C 7.7 05/22/2020     No results found for: EAG     Pertinent Cardiovascular Studies:    Most recent TTE in October 2020 (Dr. Radha Lentz): Normal LV size and systolic function with mild LVH. Normal RV size and systolic function. Technically difficult due to body habitus        Assessment and Plan: This is a 62-year-old  male with the history noted above. Symptomatically he has no angina. He has no symptoms of heart failure although objectively his jugular venous pressure remains elevated and he has trace pitting edema. He has no symptoms of arrhythmia. Of note, CMR revealed that his LV systolic function is mildly reduced with an EF of 44%. With that now and given his persistent NYHA class II symptoms I will switch him from valsartan to Entresto in attempt to normalize his systolic function, reduce his heart failure hospitalizations and mortality and optimize his blood pressure and diuretic response. I will decrease then eliminate his hydralazine eventually. Diagnosis Orders   1. Chronic systolic heart failure (HCC)  sacubitril-valsartan (ENTRESTO) 49-51 MG per tablet   2. Coronary artery disease involving native coronary artery of native heart without angina pectoris     3. Essential hypertension        · Continue diuresis with Lasix 40 mg p.o. twice daily. He continues to follow with the CHF infusion clinic  · Switch valsartan to Entresto 49-51 mg p.o. twice daily. He will call us in a week with a blood pressure log. We will increase Entresto to maximum dose at that time.   BMP will be done at CHF clinic  · Continue hydralazine 25 mg 3 times daily with a goal of stopping hydralazine once Entresto is maxed out  · Continue amlodipine 10 mg daily  · Continue Coreg 25 mg p.o. twice daily  · Continue atorvastatin 40 mg daily  · Continue aspirin 81 mg daily       Follow up with me in 6 months. Sooner if needed    We appreciate the opportunity to participate in His care!       Cirilo Baker MD  Interventional Cardiology/Structural Heart Disease  Office: [phone]  Fax: [phone]  [facility] Coordinator: Rocio Everett

## 2020-12-10 ENCOUNTER — HOSPITAL ENCOUNTER (OUTPATIENT)
Dept: OTHER | Age: 60
Setting detail: THERAPIES SERIES
Discharge: HOME | End: 2020-12-10
Payer: COMMERCIAL

## 2020-12-10 VITALS
DIASTOLIC BLOOD PRESSURE: 82 MMHG | WEIGHT: 315 LBS | BODY MASS INDEX: 40.62 KG/M2 | SYSTOLIC BLOOD PRESSURE: 144 MMHG | HEART RATE: 86 BPM | RESPIRATION RATE: 18 BRPM

## 2020-12-10 LAB
ANION GAP SERPL CALCULATED.3IONS-SCNC: 12 MMOL/L (ref 7–16)
BUN BLDV-MCNC: 22 MG/DL (ref 6–20)
CALCIUM SERPL-MCNC: 10.1 MG/DL (ref 8.6–10.2)
CHLORIDE BLD-SCNC: 99 MMOL/L (ref 98–107)
CO2: 25 MMOL/L (ref 22–29)
CREAT SERPL-MCNC: 1.4 MG/DL (ref 0.7–1.2)
GFR AFRICAN AMERICAN: >60
GFR NON-AFRICAN AMERICAN: 52 ML/MIN/1.73
GLUCOSE BLD-MCNC: 114 MG/DL (ref 74–99)
POTASSIUM SERPL-SCNC: 4.4 MMOL/L (ref 3.5–5)
PRO-BNP: 467 PG/ML (ref 0–125)
SODIUM BLD-SCNC: 136 MMOL/L (ref 132–146)

## 2020-12-10 PROCEDURE — 36415 COLL VENOUS BLD VENIPUNCTURE: CPT

## 2020-12-10 PROCEDURE — 99214 OFFICE O/P EST MOD 30 MIN: CPT

## 2020-12-10 PROCEDURE — 83880 ASSAY OF NATRIURETIC PEPTIDE: CPT

## 2020-12-10 PROCEDURE — 80048 BASIC METABOLIC PNL TOTAL CA: CPT

## 2020-12-10 NOTE — PROGRESS NOTES
Weight down 12 lbs since last visit on Nov 10th. Has no c/o. States feeling good. Assessment unremarkable. Labs obtained.

## 2020-12-18 ENCOUNTER — HOSPITAL ENCOUNTER (OUTPATIENT)
Dept: CARDIAC REHAB | Age: 60
Setting detail: THERAPIES SERIES
Discharge: HOME | End: 2020-12-18
Payer: COMMERCIAL

## 2020-12-18 VITALS
SYSTOLIC BLOOD PRESSURE: 134 MMHG | OXYGEN SATURATION: 98 % | HEART RATE: 80 BPM | DIASTOLIC BLOOD PRESSURE: 78 MMHG | HEIGHT: 75 IN | WEIGHT: 315 LBS | BODY MASS INDEX: 39.17 KG/M2 | RESPIRATION RATE: 20 BRPM

## 2020-12-18 ASSESSMENT — PATIENT HEALTH QUESTIONNAIRE - PHQ9
SUM OF ALL RESPONSES TO PHQ QUESTIONS 1-9: 2
SUM OF ALL RESPONSES TO PHQ QUESTIONS 1-9: 2

## 2020-12-23 ENCOUNTER — HOSPITAL ENCOUNTER (OUTPATIENT)
Dept: CARDIAC REHAB | Age: 60
Setting detail: THERAPIES SERIES
Discharge: HOME | End: 2020-12-23
Payer: COMMERCIAL

## 2020-12-23 PROCEDURE — 93798 PHYS/QHP OP CAR RHAB W/ECG: CPT

## 2020-12-29 ENCOUNTER — HOSPITAL ENCOUNTER (OUTPATIENT)
Dept: CARDIAC REHAB | Age: 60
Setting detail: THERAPIES SERIES
Discharge: HOME | End: 2020-12-29
Payer: COMMERCIAL

## 2020-12-29 PROCEDURE — 93798 PHYS/QHP OP CAR RHAB W/ECG: CPT

## 2020-12-31 ENCOUNTER — HOSPITAL ENCOUNTER (OUTPATIENT)
Dept: CARDIAC REHAB | Age: 60
Setting detail: THERAPIES SERIES
Discharge: HOME | End: 2020-12-31
Payer: COMMERCIAL

## 2020-12-31 PROCEDURE — 93798 PHYS/QHP OP CAR RHAB W/ECG: CPT

## 2021-01-04 NOTE — PLAN OF CARE
Continue plan Solar[employer] Pregnancy And Lactation Text: This medication is Pregnancy Category B and is considered safe. There is some data to suggest avoiding during the third trimester. It is unknown if this medication is excreted in breast milk.

## 2021-01-05 ENCOUNTER — HOSPITAL ENCOUNTER (OUTPATIENT)
Dept: CARDIAC REHAB | Age: 61
Setting detail: THERAPIES SERIES
Discharge: HOME | End: 2021-01-05
Payer: COMMERCIAL

## 2021-01-05 PROCEDURE — 93798 PHYS/QHP OP CAR RHAB W/ECG: CPT

## 2021-01-07 ENCOUNTER — HOSPITAL ENCOUNTER (OUTPATIENT)
Dept: CARDIAC REHAB | Age: 61
Setting detail: THERAPIES SERIES
Discharge: HOME | End: 2021-01-07
Payer: COMMERCIAL

## 2021-01-07 PROCEDURE — 93798 PHYS/QHP OP CAR RHAB W/ECG: CPT

## 2021-01-28 ENCOUNTER — HOSPITAL ENCOUNTER (OUTPATIENT)
Dept: CARDIAC REHAB | Age: 61
Setting detail: THERAPIES SERIES
End: 2021-01-28
Payer: COMMERCIAL

## 2021-01-28 ENCOUNTER — HOSPITAL ENCOUNTER (OUTPATIENT)
Dept: OTHER | Age: 61
Setting detail: THERAPIES SERIES
Discharge: HOME | End: 2021-01-28
Payer: COMMERCIAL

## 2021-01-28 VITALS
DIASTOLIC BLOOD PRESSURE: 73 MMHG | WEIGHT: 315 LBS | SYSTOLIC BLOOD PRESSURE: 129 MMHG | RESPIRATION RATE: 18 BRPM | HEART RATE: 84 BPM | BODY MASS INDEX: 39.9 KG/M2

## 2021-01-28 LAB
ALBUMIN SERPL-MCNC: 4.4 G/DL (ref 3.5–5.2)
ALP BLD-CCNC: 109 U/L (ref 40–129)
ALT SERPL-CCNC: 19 U/L (ref 0–40)
ANION GAP SERPL CALCULATED.3IONS-SCNC: 10 MMOL/L (ref 7–16)
ANION GAP SERPL CALCULATED.3IONS-SCNC: 11 MMOL/L (ref 7–16)
AST SERPL-CCNC: 17 U/L (ref 0–39)
BILIRUB SERPL-MCNC: 0.6 MG/DL (ref 0–1.2)
BUN BLDV-MCNC: 25 MG/DL (ref 6–20)
BUN BLDV-MCNC: 26 MG/DL (ref 6–20)
CALCIUM SERPL-MCNC: 9.9 MG/DL (ref 8.6–10.2)
CALCIUM SERPL-MCNC: 9.9 MG/DL (ref 8.6–10.2)
CHLORIDE BLD-SCNC: 101 MMOL/L (ref 98–107)
CHLORIDE BLD-SCNC: 103 MMOL/L (ref 98–107)
CHOLESTEROL, TOTAL: 128 MG/DL (ref 0–199)
CO2: 25 MMOL/L (ref 22–29)
CO2: 26 MMOL/L (ref 22–29)
CREAT SERPL-MCNC: 1.5 MG/DL (ref 0.7–1.2)
CREAT SERPL-MCNC: 1.5 MG/DL (ref 0.7–1.2)
CREATININE URINE: 38 MG/DL (ref 40–278)
GFR AFRICAN AMERICAN: 58
GFR AFRICAN AMERICAN: 58
GFR NON-AFRICAN AMERICAN: 48 ML/MIN/1.73
GFR NON-AFRICAN AMERICAN: 48 ML/MIN/1.73
GLUCOSE BLD-MCNC: 116 MG/DL (ref 74–99)
GLUCOSE BLD-MCNC: 119 MG/DL (ref 74–99)
HBA1C MFR BLD: 6.6 % (ref 4–5.6)
HDLC SERPL-MCNC: 33 MG/DL
LDL CHOLESTEROL CALCULATED: 45 MG/DL (ref 0–99)
MICROALBUMIN UR-MCNC: <12 MG/L
MICROALBUMIN/CREAT UR-RTO: ABNORMAL (ref 0–30)
POTASSIUM SERPL-SCNC: 4.6 MMOL/L (ref 3.5–5)
POTASSIUM SERPL-SCNC: 4.7 MMOL/L (ref 3.5–5)
PRO-BNP: 460 PG/ML (ref 0–125)
SODIUM BLD-SCNC: 137 MMOL/L (ref 132–146)
SODIUM BLD-SCNC: 139 MMOL/L (ref 132–146)
T4 FREE: 1.63 NG/DL (ref 0.93–1.7)
TOTAL PROTEIN: 8.3 G/DL (ref 6.4–8.3)
TRIGL SERPL-MCNC: 250 MG/DL (ref 0–149)
TSH SERPL DL<=0.05 MIU/L-ACNC: 1.57 UIU/ML (ref 0.27–4.2)
VLDLC SERPL CALC-MCNC: 50 MG/DL

## 2021-01-28 PROCEDURE — 80048 BASIC METABOLIC PNL TOTAL CA: CPT

## 2021-01-28 PROCEDURE — 80053 COMPREHEN METABOLIC PANEL: CPT

## 2021-01-28 PROCEDURE — 82044 UR ALBUMIN SEMIQUANTITATIVE: CPT

## 2021-01-28 PROCEDURE — 36415 COLL VENOUS BLD VENIPUNCTURE: CPT

## 2021-01-28 PROCEDURE — 83036 HEMOGLOBIN GLYCOSYLATED A1C: CPT

## 2021-01-28 PROCEDURE — 99214 OFFICE O/P EST MOD 30 MIN: CPT

## 2021-01-28 PROCEDURE — 83880 ASSAY OF NATRIURETIC PEPTIDE: CPT

## 2021-01-28 PROCEDURE — 80061 LIPID PANEL: CPT

## 2021-01-28 PROCEDURE — 84443 ASSAY THYROID STIM HORMONE: CPT

## 2021-01-28 PROCEDURE — 84439 ASSAY OF FREE THYROXINE: CPT

## 2021-01-28 PROCEDURE — 82570 ASSAY OF URINE CREATININE: CPT

## 2021-01-28 RX ORDER — HYDRALAZINE HYDROCHLORIDE 25 MG/1
25 TABLET, FILM COATED ORAL 3 TIMES DAILY
COMMUNITY
End: 2021-02-23 | Stop reason: ALTCHOICE

## 2021-01-28 RX ORDER — EMPAGLIFLOZIN 25 MG/1
25 TABLET, FILM COATED ORAL DAILY
COMMUNITY

## 2021-01-29 ENCOUNTER — CLINICAL DOCUMENTATION (OUTPATIENT)
Dept: CARDIAC REHAB | Age: 61
End: 2021-01-29

## 2021-01-29 ENCOUNTER — TELEPHONE (OUTPATIENT)
Dept: CARDIOLOGY CLINIC | Age: 61
End: 2021-01-29

## 2021-01-29 NOTE — TELEPHONE ENCOUNTER
----- Message from Jerod Manrique MD sent at 1/28/2021  2:59 PM EST -----  We can decrease his Lasix to once a day and check his weights over a week and see how he is doing. He is slowly becoming hemoconcentrated.

## 2021-02-02 ENCOUNTER — TELEPHONE (OUTPATIENT)
Dept: CARDIOLOGY CLINIC | Age: 61
End: 2021-02-02

## 2021-02-02 DIAGNOSIS — I50.22 CHRONIC SYSTOLIC HEART FAILURE (HCC): Primary | ICD-10-CM

## 2021-02-02 RX ORDER — FUROSEMIDE 40 MG/1
40 TABLET ORAL DAILY
Qty: 90 TABLET | Refills: 1 | Status: SHIPPED
Start: 2021-02-02 | End: 2022-02-09 | Stop reason: DRUGHIGH

## 2021-02-02 NOTE — TELEPHONE ENCOUNTER
Placed order for lasix 40mg daily per DR Renetta Melendez order . Will Sanches has f/u call set 2 5 with Yadira Tuttle RE weight trends on the reduced lasix dose.

## 2021-02-05 ENCOUNTER — TELEPHONE (OUTPATIENT)
Dept: CARDIOLOGY CLINIC | Age: 61
End: 2021-02-05

## 2021-02-13 DIAGNOSIS — I50.22 CHRONIC SYSTOLIC HEART FAILURE (HCC): ICD-10-CM

## 2021-02-15 RX ORDER — SACUBITRIL AND VALSARTAN 49; 51 MG/1; MG/1
TABLET, FILM COATED ORAL
Qty: 60 TABLET | Refills: 1 | Status: SHIPPED
Start: 2021-02-15 | End: 2021-11-18 | Stop reason: DRUGHIGH

## 2021-02-18 DIAGNOSIS — G47.33 OBSTRUCTIVE SLEEP APNEA (ADULT) (PEDIATRIC): Primary | ICD-10-CM

## 2021-02-23 ENCOUNTER — TELEPHONE (OUTPATIENT)
Dept: CARDIOLOGY CLINIC | Age: 61
End: 2021-02-23

## 2021-02-23 NOTE — TELEPHONE ENCOUNTER
HEATHER CALLED. BP has been running a little low. Yesterday am (before meds) 115/74. Then took meds. Swam, did some running around. BP 97/58. Yesterday was 98/60 and his daughter rechecked manually and it was about the same. He feels good. No chest pain. But when he goes from a bending to standing position he does get dizzy. Tristan [phone]. Wife [phone].

## 2021-02-23 NOTE — TELEPHONE ENCOUNTER
MD Campuzano; Jalen Khan 1 minute ago (4:56 PM)     Stop hydralazine    Message text        Violetta Lou was notified of DR Shay's order to stop hydralazine. He repeated it back. He will call office if becomes hypertensive.

## 2021-03-09 ENCOUNTER — TELEPHONE (OUTPATIENT)
Dept: CARDIOLOGY CLINIC | Age: 61
End: 2021-03-09

## 2021-03-23 ENCOUNTER — TELEPHONE (OUTPATIENT)
Dept: CARDIOLOGY CLINIC | Age: 61
End: 2021-03-23

## 2021-03-23 NOTE — TELEPHONE ENCOUNTER
Was told to keep track of blood pressures. Pt is watching what he eats and is exercising and is losing weight. Blood pressures have been normal until today. BP today was 96/68. Feels dizzy when he bends over. No chest pain or pressure. Takes amlodipine 10 mg a day. Wants to know if there is anything he should do.

## 2021-03-24 ENCOUNTER — TELEPHONE (OUTPATIENT)
Dept: CARDIOLOGY CLINIC | Age: 61
End: 2021-03-24

## 2021-03-24 NOTE — TELEPHONE ENCOUNTER
Requested call back with today's bp and how he is feeling    Change position slowly  Sit in chair to tie shoes or pick something up off floor  Keep hydrated  Monitor urine color (clear to lemonade is good, if getting darker hydrate more)   If dizzy / lightheaded more than just sitting to standing/ bending over to standing. Hydrate with 2 extra cups fluids. Can lay down and elevate legs if a continued dizziness. Make sure to eat with both doses coreg  Can consider spacing out entresto from carvedilol   Can consider furosemide dose reduction if no s/s chf and continued wt loss. Pending call back with update on how he feels today.

## 2021-03-24 NOTE — TELEPHONE ENCOUNTER
Pt called and is feeling better. He thinking he didn't eat that day or in a while when he was feeling bad. Will call if he is having any other issue(s).

## 2021-03-24 NOTE — TELEPHONE ENCOUNTER
Pt called and is feeling better. He thinking he didn't eat that day or in a while when he was feeling bad. Will call if he is having any other issue(s). per Maggi mcmahan talking with patient , he called back.

## 2021-04-22 ENCOUNTER — HOSPITAL ENCOUNTER (OUTPATIENT)
Dept: OTHER | Age: 61
Setting detail: THERAPIES SERIES
Discharge: HOME | End: 2021-04-22
Payer: COMMERCIAL

## 2021-04-22 VITALS
BODY MASS INDEX: 38.51 KG/M2 | DIASTOLIC BLOOD PRESSURE: 65 MMHG | HEART RATE: 82 BPM | RESPIRATION RATE: 18 BRPM | SYSTOLIC BLOOD PRESSURE: 142 MMHG | WEIGHT: 304 LBS

## 2021-04-22 LAB
ANION GAP SERPL CALCULATED.3IONS-SCNC: 11 MMOL/L (ref 7–16)
BUN BLDV-MCNC: 29 MG/DL (ref 6–20)
CALCIUM SERPL-MCNC: 9.5 MG/DL (ref 8.6–10.2)
CHLORIDE BLD-SCNC: 103 MMOL/L (ref 98–107)
CO2: 23 MMOL/L (ref 22–29)
CREAT SERPL-MCNC: 1.6 MG/DL (ref 0.7–1.2)
GFR AFRICAN AMERICAN: 54
GFR NON-AFRICAN AMERICAN: 44 ML/MIN/1.73
GLUCOSE BLD-MCNC: 114 MG/DL (ref 74–99)
POTASSIUM SERPL-SCNC: 4.4 MMOL/L (ref 3.5–5)
PRO-BNP: 167 PG/ML (ref 0–125)
SODIUM BLD-SCNC: 137 MMOL/L (ref 132–146)

## 2021-04-22 PROCEDURE — 36415 COLL VENOUS BLD VENIPUNCTURE: CPT

## 2021-04-22 PROCEDURE — 83880 ASSAY OF NATRIURETIC PEPTIDE: CPT

## 2021-04-22 PROCEDURE — 80048 BASIC METABOLIC PNL TOTAL CA: CPT

## 2021-04-22 PROCEDURE — 99214 OFFICE O/P EST MOD 30 MIN: CPT

## 2021-04-22 NOTE — PROGRESS NOTES
[address] [facility]   1960            Referring Doctor:   Primary Care Physician: Dr. Jimena Choudhury  Cardiologist: Dr. Raimundo Medina  Nephrologist:         History of Present Illness:     Babita Carrillo is a 63 y.o. male with a history of HFpEF, most recent EF 60-65% on 10/10/20. Patient Story:    He does not  have dyspnea with exertion, shortness of breath, or decline in overall functional capacity. He does not have orthopnea, PND, nocturnal cough or hemoptysis. He does not have abdominal distention or bloating, early satiety, anorexia/change in appetite. He does has a good urinary response to  oral diuretic. He does not have  lower extremity edema. He denies lightheadedness, dizziness. He denies palpitations, syncope or near syncope. He does not complain of chest pain, pressure, discomfort. No Known Allergies      No outpatient medications have been marked as taking for the 4/22/21 encounter [facility] HOSPITAL Encounter) with [facility] ROOM 1.           Guideline directed medical:  ARNI/ACE I/ARB: Yes  Beta blocker:   Yes  Aldosterone antagonist:  Yes        Physical Examination:     BP (!) 142/65   Pulse 82   Resp 18   Wt (!) 304 lb (137.9 kg)   BMI 38.51 kg/m²     Assessment  Charting Type: Shift assessment              HEENT  HEENT (WDL): Within Defined Limits    Respiratory  Respiratory Pattern: Regular  Respiratory Depth: Normal  Respiratory Quality/Effort: Unlabored  Chest Assessment: Chest expansion symmetrical, Trachea midline  L Breath Sounds: Clear, Diminished  R Breath Sounds: Clear, Diminished              Cardiac  Cardiac Regularity: Regular  Cardiac Rhythm: Normal sinus rhythm    Rhythm Interpretation  Pulse: 82         Gastrointestinal  Abdominal (WDL): Exceptions to WDL               Peripheral Vascular  Peripheral Vascular (WDL): Within Defined Limits              Musculoskeletal  RUE: Full movement  LUE: Full movement  RL Extremity: Full movement, Swelling  LL Extremity: Full movement, Swelling    Genitourinary  Genitourinary (WDL): Within Defined Limits                             Pulse: 82                   LAB DATA:    BNP  No results for input(s): BNP in the last 72 hours. proBNP  No results for input(s): PROBNP in the last 72 hours. BMP  No results for input(s): NA, K, CL, CO2, BUN, CREATININE, GLUCOSE, CALCIUM in the last 72 hours. WEIGHTS:  Wt Readings from Last 3 Encounters:   04/22/21 (!) 304 lb (137.9 kg)   01/28/21 (!) 315 lb (142.9 kg)   12/18/20 (!) 323 lb (146.5 kg)         TELEMETRY:  Cardiac Regularity: Regular  Cardiac Rhythm/Interpretation: NSR        ASSESSMENT:  William Sun is evolemic with stable weights     Interventions completed this visit:  IV diuretics given no  Lab work obtained yes, BMP, BNP   Reviewed continue current medications that patient as prescribed answered any questions   Educated on signs and symptoms of HF  Educated on low sodium diet    PLAN:  Scheduled to follow up in CHF clinic on Oct 14th  Given clinic phone number and aware of signs and symptoms to call with any HF change in symptoms.

## 2021-04-29 ENCOUNTER — HOSPITAL ENCOUNTER (OUTPATIENT)
Dept: OTHER | Age: 61
Setting detail: THERAPIES SERIES
End: 2021-04-29
Payer: COMMERCIAL

## 2021-10-18 ENCOUNTER — HOSPITAL ENCOUNTER (OUTPATIENT)
Dept: OTHER | Age: 61
Setting detail: THERAPIES SERIES
Discharge: HOME | End: 2021-10-18
Payer: COMMERCIAL

## 2021-10-18 VITALS
BODY MASS INDEX: 40.54 KG/M2 | WEIGHT: 315 LBS | SYSTOLIC BLOOD PRESSURE: 117 MMHG | DIASTOLIC BLOOD PRESSURE: 75 MMHG | HEART RATE: 74 BPM | RESPIRATION RATE: 18 BRPM

## 2021-10-18 LAB
ANION GAP SERPL CALCULATED.3IONS-SCNC: 11 MMOL/L (ref 7–16)
BUN BLDV-MCNC: 33 MG/DL (ref 6–20)
CALCIUM SERPL-MCNC: 9.7 MG/DL (ref 8.6–10.2)
CHLORIDE BLD-SCNC: 103 MMOL/L (ref 98–107)
CO2: 23 MMOL/L (ref 22–29)
CREAT SERPL-MCNC: 1.6 MG/DL (ref 0.7–1.2)
GFR AFRICAN AMERICAN: 54
GFR NON-AFRICAN AMERICAN: 44 ML/MIN/1.73
GLUCOSE BLD-MCNC: 108 MG/DL (ref 74–99)
POTASSIUM SERPL-SCNC: 5 MMOL/L (ref 3.5–5)
PRO-BNP: 102 PG/ML (ref 0–125)
SODIUM BLD-SCNC: 137 MMOL/L (ref 132–146)

## 2021-10-18 PROCEDURE — 83880 ASSAY OF NATRIURETIC PEPTIDE: CPT

## 2021-10-18 PROCEDURE — 99214 OFFICE O/P EST MOD 30 MIN: CPT

## 2021-10-18 PROCEDURE — 80048 BASIC METABOLIC PNL TOTAL CA: CPT

## 2021-10-18 PROCEDURE — 36415 COLL VENOUS BLD VENIPUNCTURE: CPT

## 2021-10-18 NOTE — PROGRESS NOTES
[address] [facility]   1960            Referring Doctor:   Primary Care Physician: Dr. Elisabet Musa  Cardiologist: Dr. Chris Victor  Nephrologist:         History of Present Illness:     Elizabeth Mtz is a 63 y.o. male with a history of HFpEF, most recent EF 60-65% on 10/10/20. Patient Story:    He does not  have dyspnea with exertion, shortness of breath, or decline in overall functional capacity. He does not have orthopnea, PND, nocturnal cough or hemoptysis. He does not have abdominal distention or bloating, early satiety, anorexia/change in appetite. He does has a good urinary response to  oral diuretic. He does not have  lower extremity edema. He denies lightheadedness, dizziness. He denies palpitations, syncope or near syncope. He does not complain of chest pain, pressure, discomfort.          No Known Allergies      Outpatient Medications Marked as Taking for the 10/18/21 encounter [facility] HOSPITAL Encounter) with [facility] ROOM 1   Medication Sig Dispense Refill    ENTRESTO 49-51 MG per tablet TAKE 1 TABLET BY MOUTH TWICE A DAY 60 tablet 1    furosemide (LASIX) 40 MG tablet Take 1 tablet by mouth daily 90 tablet 1    empagliflozin (JARDIANCE) 25 MG tablet Take 25 mg by mouth daily      atorvastatin (LIPITOR) 40 MG tablet Take 40 mg by mouth daily      carvedilol (COREG) 25 MG tablet Take 25 mg by mouth 2 times daily (with meals)      aspirin EC 81 MG EC tablet Take 1 tablet by mouth daily 90 tablet 3    amLODIPine (NORVASC) 10 MG tablet Take 1 tablet by mouth daily 90 tablet 3    spironolactone (ALDACTONE) 25 MG tablet Take 1 tablet by mouth daily 90 tablet 3    potassium chloride (KLOR-CON M20) 20 MEQ extended release tablet Take 1 tablet by mouth 2 times daily TAKE 1 TABLET BY MOUTH TWICE A  tablet 3    levothyroxine (SYNTHROID) 100 MCG tablet Take 1 tablet by mouth Daily (Patient taking differently: Take 150 mcg by mouth Daily ) 90 tablet 0    vitamin D3 (CHOLECALCIFEROL) 400 units TABS tablet Take 400 Units by mouth daily      vitamin E 400 UNIT capsule Take 400 Units by mouth daily      magnesium oxide (MAG-OX) 400 MG tablet TAKE 1 TABLET BY MOUTH DAILY 90 tablet 1    Multiple Vitamins-Minerals (THERAPEUTIC MULTIVITAMIN-MINERALS) tablet Take 1 tablet by mouth daily             Guideline directed medical:  ARNI/ACE I/ARB: Yes  Beta blocker: Yes  Aldosterone antagonist:  Yes        Physical Examination:     /75   Pulse 74   Resp 18   Wt (!) 320 lb (145.2 kg)   BMI 40.54 kg/m²     Assessment  Charting Type: Shift assessment              HEENT  HEENT (WDL): Within Defined Limits    Respiratory  Respiratory Pattern: Regular  Respiratory Depth: Normal  Respiratory Quality/Effort: Unlabored  Chest Assessment: Chest expansion symmetrical, Trachea midline  L Breath Sounds: Clear, Diminished  R Breath Sounds: Clear, Diminished              Cardiac  Cardiac Regularity: Regular    Rhythm Interpretation  Pulse: 74         Gastrointestinal  Abdominal (WDL): Exceptions to WDL               Peripheral Vascular  Peripheral Vascular (WDL): Within Defined Limits              Musculoskeletal  RUE: Full movement  LUE: Full movement  RL Extremity: Full movement, Swelling  LL Extremity: Full movement, Swelling    Genitourinary  Genitourinary (WDL): Within Defined Limits                             Pulse: 74                   LAB DATA:    BNP  No results for input(s): BNP in the last 72 hours. proBNP  No results for input(s): PROBNP in the last 72 hours. BMP  No results for input(s): NA, K, CL, CO2, BUN, CREATININE, GLUCOSE, CALCIUM in the last 72 hours. WEIGHTS:  Wt Readings from Last 3 Encounters:   10/18/21 (!) 320 lb (145.2 kg)   04/22/21 (!) 304 lb (137.9 kg)   01/28/21 (!) 315 lb (142.9 kg)         TELEMETRY:  Cardiac Regularity: Regular  Cardiac Rhythm/Interpretation: NSR        ASSESSMENT:  Lina Gomez is evolemic with weight gain.  States he hasn't been to the gym in awhile. Denies any increased SOB. No increased swelling. Interventions completed this visit:  IV diuretics given no  Lab work obtained yes, BMP, BNP   Reviewed continue current medications that patient as prescribed answered any questions   Educated on signs and symptoms of HF  Educated on low sodium diet    PLAN:  Scheduled to follow up in CHF clinic on April 18,2021  Given clinic phone number and aware of signs and symptoms to call with any HF change in symptoms.

## 2021-11-17 ENCOUNTER — HOSPITAL ENCOUNTER (OUTPATIENT)
Age: 61
Discharge: HOME | End: 2021-11-17
Payer: COMMERCIAL

## 2021-11-17 DIAGNOSIS — I50.22 CHRONIC SYSTOLIC HEART FAILURE (HCC): Primary | ICD-10-CM

## 2021-11-17 DIAGNOSIS — I50.22 CHRONIC SYSTOLIC HEART FAILURE (HCC): ICD-10-CM

## 2021-11-17 LAB
ANION GAP SERPL CALCULATED.3IONS-SCNC: 9 MMOL/L (ref 7–16)
BUN BLDV-MCNC: 44 MG/DL (ref 6–20)
CALCIUM SERPL-MCNC: 9.8 MG/DL (ref 8.6–10.2)
CHLORIDE BLD-SCNC: 101 MMOL/L (ref 98–107)
CO2: 25 MMOL/L (ref 22–29)
CREAT SERPL-MCNC: 1.7 MG/DL (ref 0.7–1.2)
GFR AFRICAN AMERICAN: 50
GFR NON-AFRICAN AMERICAN: 41 ML/MIN/1.73
GLUCOSE BLD-MCNC: 102 MG/DL (ref 74–99)
POTASSIUM SERPL-SCNC: 4.8 MMOL/L (ref 3.5–5)
SODIUM BLD-SCNC: 135 MMOL/L (ref 132–146)

## 2021-11-17 PROCEDURE — 36415 COLL VENOUS BLD VENIPUNCTURE: CPT

## 2021-11-17 PROCEDURE — 80048 BASIC METABOLIC PNL TOTAL CA: CPT

## 2021-11-18 ENCOUNTER — TELEPHONE (OUTPATIENT)
Dept: CARDIOLOGY CLINIC | Age: 61
End: 2021-11-18

## 2021-11-18 DIAGNOSIS — I50.22 CHRONIC SYSTOLIC HEART FAILURE (HCC): Primary | ICD-10-CM

## 2021-11-18 RX ORDER — SACUBITRIL AND VALSARTAN 97; 103 MG/1; MG/1
1 TABLET, FILM COATED ORAL 2 TIMES DAILY
COMMUNITY
End: 2022-01-24 | Stop reason: SDUPTHER

## 2021-11-18 NOTE — RESULT ENCOUNTER NOTE
Labs reviewed  Vitals at last CHF clinic appointment: /75   Pulse 74      If he is feeling ok (no dizziness/lightheadedness)  Loraine please have him increase Entresto to 97/103 mg twice daily (he can take two tablets of his 49/51 mg twice daily until that prescription is gone so he doesn't waste medication) Then please send the new strength to the pharmacy.      Have him hold potassium supplements    Follow up blood work in 1 week ( BMP/BNP)    Thank you

## 2021-11-18 NOTE — TELEPHONE ENCOUNTER
----- Message from BRITTNEY Mckeon CNP sent at 11/18/2021 11:03 AM EST -----  Labs reviewed  Vitals at last CHF clinic appointment: /75   Pulse 74      If he is feeling ok (no dizziness/lightheadedness)  Loraine please have him increase Entresto to 97/103 mg twice daily (he can take two tablets of his 49/51 mg twice daily until that prescription is gone so he doesn't waste medication) Then please send the new strength to the pharmacy.      Have him hold potassium supplements    Follow up blood work in 1 week ( BMP/BNP)    Thank you

## 2021-12-04 ENCOUNTER — HOSPITAL ENCOUNTER (OUTPATIENT)
Age: 61
Discharge: HOME | End: 2021-12-04
Payer: COMMERCIAL

## 2021-12-04 DIAGNOSIS — I50.22 CHRONIC SYSTOLIC HEART FAILURE (HCC): ICD-10-CM

## 2021-12-04 LAB
ANION GAP SERPL CALCULATED.3IONS-SCNC: 11 MMOL/L (ref 7–16)
BUN BLDV-MCNC: 34 MG/DL (ref 6–20)
CALCIUM SERPL-MCNC: 9.6 MG/DL (ref 8.6–10.2)
CHLORIDE BLD-SCNC: 102 MMOL/L (ref 98–107)
CO2: 28 MMOL/L (ref 22–29)
CREAT SERPL-MCNC: 1.7 MG/DL (ref 0.7–1.2)
GFR AFRICAN AMERICAN: 50
GFR NON-AFRICAN AMERICAN: 41 ML/MIN/1.73
GLUCOSE BLD-MCNC: 130 MG/DL (ref 74–99)
POTASSIUM SERPL-SCNC: 4.9 MMOL/L (ref 3.5–5)
PRO-BNP: 95 PG/ML (ref 0–125)
SODIUM BLD-SCNC: 141 MMOL/L (ref 132–146)

## 2021-12-04 PROCEDURE — 83880 ASSAY OF NATRIURETIC PEPTIDE: CPT

## 2021-12-04 PROCEDURE — 36415 COLL VENOUS BLD VENIPUNCTURE: CPT

## 2021-12-04 PROCEDURE — 80048 BASIC METABOLIC PNL TOTAL CA: CPT

## 2021-12-06 NOTE — RESULT ENCOUNTER NOTE
Labs reviewed    Current GDMT:  Entresto 97/103 mg BID  Coreg 25 mg BID  Spironolactone 25 mg daily   Jardiance 25 mg daily     When we increase Entesto to high dose, instructed to hold potassium supplements however still listed on med rec? If he is still taking potassium please have him decrease to 20 meq daily. If he infact is not taking them please remove from medication list and he can continue to hold    Does he have any ability to provide BP/HR?  Pending result would recommend stopping norvasc so that we can upt itrate guideline directed therapy such as increased coreg (given weight) or add hydralazine/isordil    Thank you

## 2021-12-14 ENCOUNTER — TELEPHONE (OUTPATIENT)
Dept: CARDIOLOGY CLINIC | Age: 61
End: 2021-12-14

## 2021-12-14 DIAGNOSIS — I50.22 CHRONIC SYSTOLIC HEART FAILURE (HCC): Primary | ICD-10-CM

## 2021-12-14 NOTE — TELEPHONE ENCOUNTER
Current GDMT:  Entresto 97/103 mg BID  Coreg 25 mg BID  Spironolactone 25 mg daily   Jardiance 25 mg daily     Is he able to provide blood pressure and heart rate? If not please set up in cardiology office for RN BP/HR check >> or we can do this at the CHF clinic if they prefer    Please have him reduce his potassium supplement to 20 meq daily     After adjustment of potassium tablet would like him to have follow up labs (BMP) in 1 week    If they would like the follow up BP/HR and labs can be together at the CHF clinic in 7-10 days.      Thank you

## 2021-12-14 NOTE — TELEPHONE ENCOUNTER
Wife called wants to know if new BMP and BNP  would be required     Should he continue on his current dose of Entresto  the patient should continue    They are currently NOT holding potassium supplements as recommended 11-18    Currently taking Entresto as directed 97/103    No c/o of increased  dizziness , CP , SOB or edema

## 2021-12-16 RX ORDER — POTASSIUM CHLORIDE 20 MEQ/1
20 TABLET, EXTENDED RELEASE ORAL DAILY
Qty: 90 TABLET | Refills: 1 | Status: SHIPPED
Start: 2021-12-16 | End: 2021-12-28 | Stop reason: ALTCHOICE

## 2021-12-16 NOTE — TELEPHONE ENCOUNTER
Left provider instructions on VM, requested call back to confirm understanding. (he needs to let office know if he hasway to check  Bp/hr or set bp/hr check for him. )  Speak with [facility] please. Bmp order placed in epic,  K+ dose adjustment sent escribed [facility]  Pending call back to confirm all of L Laura DRIVER instructions.

## 2021-12-20 ENCOUNTER — HOSPITAL ENCOUNTER (OUTPATIENT)
Age: 61
Discharge: HOME | End: 2021-12-20
Payer: COMMERCIAL

## 2021-12-20 DIAGNOSIS — I50.22 CHRONIC SYSTOLIC HEART FAILURE (HCC): ICD-10-CM

## 2021-12-20 LAB
ANION GAP SERPL CALCULATED.3IONS-SCNC: 12 MMOL/L (ref 7–16)
BUN BLDV-MCNC: 37 MG/DL (ref 6–20)
CALCIUM SERPL-MCNC: 9.6 MG/DL (ref 8.6–10.2)
CHLORIDE BLD-SCNC: 101 MMOL/L (ref 98–107)
CO2: 21 MMOL/L (ref 22–29)
CREAT SERPL-MCNC: 1.6 MG/DL (ref 0.7–1.2)
GFR AFRICAN AMERICAN: 54
GFR NON-AFRICAN AMERICAN: 44 ML/MIN/1.73
GLUCOSE BLD-MCNC: 121 MG/DL (ref 74–99)
POTASSIUM SERPL-SCNC: 4.9 MMOL/L (ref 3.5–5)
SODIUM BLD-SCNC: 134 MMOL/L (ref 132–146)

## 2021-12-20 PROCEDURE — 36415 COLL VENOUS BLD VENIPUNCTURE: CPT

## 2021-12-20 PROCEDURE — 80048 BASIC METABOLIC PNL TOTAL CA: CPT

## 2021-12-20 NOTE — TELEPHONE ENCOUNTER
A 3rd message left for Roxi Rodriguez  to call office to confirm understanding of Jessica Torres CNP instructions. Left detailed instructions again today.

## 2021-12-21 NOTE — RESULT ENCOUNTER NOTE
Labs reviewed, stable   Have been attempting to reach patient (see previous telephone encounters)  We need BP/HR check to continue medication titration    Thank you

## 2021-12-28 NOTE — TELEPHONE ENCOUNTER
Patients spouse called in with questions regarding potassium and lab results from 12/20/21. She confirmed that back on 12/14/21 there was a miscommunication and patient did in fact HOLD his potassium since 11/18/21 as we had instructed. Note on 12-14 says he was not and so you advised to reduce to 20 meq daily. She wants to know if he needs to go back on it or since it was a mix up if he should continue NOT taking it since his labs still showed potassium at 4.9. Please advise and also on a follow up.

## 2022-01-03 ENCOUNTER — TELEPHONE (OUTPATIENT)
Dept: CARDIOLOGY CLINIC | Age: 62
End: 2022-01-03

## 2022-01-03 DIAGNOSIS — I50.22 CHRONIC SYSTOLIC HEART FAILURE (HCC): Primary | ICD-10-CM

## 2022-01-03 NOTE — TELEPHONE ENCOUNTER
Left vm to call office with bp/hr        (Or can set check in office if unable to provide)    Provided call back # [facility] cardiology. Angeles Olguin CNP requesting vitals for titration     Also mailed letter with scripts enclosed.      Destini Sena RN

## 2022-01-03 NOTE — TELEPHONE ENCOUNTER
----- Message from BRITTNEY Roman CNP sent at 12/21/2021  1:31 PM EST -----  Labs reviewed, stable   Have been attempting to reach patient (see previous telephone encounters)  We need BP/HR check to continue medication titration    Thank you

## 2022-01-20 NOTE — TELEPHONE ENCOUNTER
2020 [facility] home #, spoke with  Herman   BP checks:    122/78 , 110/70,   106/68 , 1167/76, 110/70     Will check heart rate next bp check. (it was not abnormal on machine, but did not record the heart rates. Continue Checking daily bp will record HR also. Going for labs end of the week.  (he is working now)   Has upcoming visit with DR Rubin [address]   Date Time Provider Jacob Busch   2/7/2022 12:00 PM Hilda Rodriguez MD [address] [place] [address]   4/18/2022  2:00 PM [facility] ROOM 1 ANEUDY [facility]

## 2022-01-24 RX ORDER — SACUBITRIL AND VALSARTAN 97; 103 MG/1; MG/1
1 TABLET, FILM COATED ORAL 2 TIMES DAILY
Qty: 60 TABLET | Refills: 1 | Status: SHIPPED
Start: 2022-01-24 | End: 2022-02-07 | Stop reason: SDUPTHER

## 2022-01-26 RX ORDER — ATORVASTATIN CALCIUM 40 MG/1
40 TABLET, FILM COATED ORAL DAILY
Qty: 90 TABLET | Refills: 3 | Status: SHIPPED
Start: 2022-01-26 | End: 2022-03-24 | Stop reason: SDUPTHER

## 2022-01-27 NOTE — TELEPHONE ENCOUNTER
Message  Received: BRITTNEY Garcia - CNP  Jennifer Roblero, RN  Thank you - will assess HR at next office appointment.

## 2022-02-02 ENCOUNTER — HOSPITAL ENCOUNTER (OUTPATIENT)
Age: 62
Discharge: HOME | End: 2022-02-02
Payer: COMMERCIAL

## 2022-02-02 LAB
ANION GAP SERPL CALCULATED.3IONS-SCNC: 14 MMOL/L (ref 7–16)
BUN BLDV-MCNC: 33 MG/DL (ref 6–20)
CALCIUM SERPL-MCNC: 9.3 MG/DL (ref 8.6–10.2)
CHLORIDE BLD-SCNC: 99 MMOL/L (ref 98–107)
CO2: 21 MMOL/L (ref 22–29)
CREAT SERPL-MCNC: 1.6 MG/DL (ref 0.7–1.2)
GFR AFRICAN AMERICAN: 54
GFR NON-AFRICAN AMERICAN: 44 ML/MIN/1.73
GLUCOSE BLD-MCNC: 135 MG/DL (ref 74–99)
POTASSIUM SERPL-SCNC: 4.6 MMOL/L (ref 3.5–5)
PRO-BNP: 66 PG/ML (ref 0–125)
SODIUM BLD-SCNC: 134 MMOL/L (ref 132–146)

## 2022-02-02 PROCEDURE — 36415 COLL VENOUS BLD VENIPUNCTURE: CPT

## 2022-02-02 PROCEDURE — 80048 BASIC METABOLIC PNL TOTAL CA: CPT

## 2022-02-02 PROCEDURE — 83880 ASSAY OF NATRIURETIC PEPTIDE: CPT

## 2022-02-03 NOTE — RESULT ENCOUNTER NOTE
Labs reviewed, stable     Current GDMT:  Entresto 97/103 mg BID  Coreg 25 mg BID  Spironolactone 25 mg daily   Jardiance 25 mg daily     Awaiting follow up vitals so we can titrate GDMT safely   On norvasc 10 mg daily - tentative plan to stop and if needs improved BP control either titrate Coreg is HR allows or add hydralazine/isordil    Thank you

## 2022-02-04 NOTE — RESULT ENCOUNTER NOTE
Please have him decrease norvasc to 5 mg daily, will wean off.      Then will determine if he needs improved BP control either titrate Coreg if HR allows or add hydralazine/isordil    Thank you

## 2022-02-07 ENCOUNTER — TELEPHONE (OUTPATIENT)
Dept: CARDIOLOGY CLINIC | Age: 62
End: 2022-02-07

## 2022-02-07 ENCOUNTER — OFFICE VISIT (OUTPATIENT)
Dept: CARDIOLOGY CLINIC | Age: 62
End: 2022-02-07
Payer: COMMERCIAL

## 2022-02-07 VITALS
BODY MASS INDEX: 39.17 KG/M2 | WEIGHT: 315 LBS | SYSTOLIC BLOOD PRESSURE: 112 MMHG | HEART RATE: 74 BPM | HEIGHT: 75 IN | DIASTOLIC BLOOD PRESSURE: 70 MMHG | RESPIRATION RATE: 20 BRPM

## 2022-02-07 DIAGNOSIS — I50.22 CHRONIC SYSTOLIC HEART FAILURE (HCC): Primary | ICD-10-CM

## 2022-02-07 DIAGNOSIS — I50.32 CHRONIC HEART FAILURE WITH PRESERVED EJECTION FRACTION (HFPEF) (HCC): Primary | ICD-10-CM

## 2022-02-07 DIAGNOSIS — I25.10 CORONARY ARTERY DISEASE INVOLVING NATIVE CORONARY ARTERY OF NATIVE HEART WITHOUT ANGINA PECTORIS: ICD-10-CM

## 2022-02-07 DIAGNOSIS — I50.33 ACUTE ON CHRONIC DIASTOLIC HEART FAILURE (HCC): Primary | ICD-10-CM

## 2022-02-07 PROCEDURE — 99213 OFFICE O/P EST LOW 20 MIN: CPT | Performed by: INTERNAL MEDICINE

## 2022-02-07 PROCEDURE — 93000 ELECTROCARDIOGRAM COMPLETE: CPT | Performed by: INTERNAL MEDICINE

## 2022-02-07 RX ORDER — AMLODIPINE BESYLATE 5 MG/1
5 TABLET ORAL DAILY
Qty: 90 TABLET | Refills: 1 | Status: SHIPPED
Start: 2022-02-07 | End: 2022-03-04 | Stop reason: DRUGHIGH

## 2022-02-07 RX ORDER — ZINC GLUCONATE 50 MG
50 TABLET ORAL DAILY
COMMUNITY

## 2022-02-07 NOTE — PROGRESS NOTES
[address] [facility]   Heart and Vascular [place]   Clinic Note     Date:2/7/22   Patient Son Ortiz  YOB: 1960  Age: 63 y.o. Primary Care Provider: Nichole Don MD    Subjective     This is a 53-year-old  male who returns for post hospital follow-up accompanied by his wife. He has been doing very well has not had any heart failure hospitalizations in over a year. He denies angina, dyspnea, orthopnea, PND, palpitations, syncope, presyncope, lower extremity edema. He denies pathologic bleeding. He is compliant with his medications. Works at the [employer]         A focused history review includes:  1. Chronic HFmrEF with acute decompensation in October 2020  2. Mild diffuse CAD with mid LAD  (J- score of 1)-coronary angiogram 10/12/2020 (Dr. Champ Closs). 1. CMR at [facility] dated 11/16/2020: LVEF 44% and dilated, normal RV size and systolic function, mild MR. There is mid to apical anterior, septal, lateral and inferior delayed enhancement in a near transmural pattern consistent with scar  3. Hypertension  4. CKD3b with baseline creatinine 1.6-1.7  5.  Type 2 diabetes on orals       Past History    Past Medical History:         Diagnosis Date    Arthritis     knees    [facility]est[facility]     CKD (chronic kidney disease)     DM (diabetes mellitus) ([facility] [address]     Hyperlipidemia     Hypertension     Hypothyroidism     Thyroid nodule 11/07/2020          Social History:    Social History     Tobacco History     Smoking Status  Never Smoker    Smokeless Tobacco Use  Never Used          Alcohol History     Alcohol Use Status  Not Currently Drinks/Week  0 Standard drinks or equivalent per week Amount  0.0 standard drinks of alcohol/wk          Drug Use     Drug Use Status  Never          Sexual Activity     Sexually Active  Not Asked                    Family History:       Problem Relation Age of Onset    Arthritis Mother    Homero Vogel COPD Mother     Hypertension Mother     Colon Cancer Mother     Uterine Cancer Mother     Diabetes Father     Stroke Father     Coronary Art Dis Father         s/p CABG x3    Hypertension Father     Heart Disease Father     Hypertension Sister          Review of Systems   General ROS: No weight loss fevers or chills  Psychological ROS: No new depression or anxiety or altered mood  Ophthalmic ROS: No new vision changes or diplopia  Respiratory ROS: No cough, wheezing, shortness of breath, or hemoptysis  Cardiovascular ROS: See HPI  Gastrointestinal ROS: No nausea, vomiting, constipation, diarrhea, hematemesis, melena, or hematochezia  Genito-Urinary ROS: No dysuria, hematuria, or new incontinence  Musculoskeletal ROS: No new muscle pain, joint pain, joint swelling, or back pain  Neurological ROS: No new numbness or paresthesias, no focal weakness, no altered speech, no new memory loss  Dermatological ROS: No new rashes, no pruritus, no skin masses        Medications     Current Outpatient Medications   Medication Sig Dispense Refill    amLODIPine (NORVASC) 5 MG tablet Take 1 tablet by mouth daily 90 tablet 1    zinc 50 MG TABS tablet Take 50 mg by mouth daily      atorvastatin (LIPITOR) 40 MG tablet Take 1 tablet by mouth daily 90 tablet 3    sacubitril-valsartan (ENTRESTO)  MG per tablet Take 1 tablet by mouth 2 times daily 60 tablet 1    furosemide (LASIX) 40 MG tablet Take 1 tablet by mouth daily 90 tablet 1    empagliflozin (JARDIANCE) 25 MG tablet Take 25 mg by mouth daily      carvedilol (COREG) 25 MG tablet Take 25 mg by mouth 2 times daily (with meals)      aspirin EC 81 MG EC tablet Take 1 tablet by mouth daily 90 tablet 3    spironolactone (ALDACTONE) 25 MG tablet Take 1 tablet by mouth daily 90 tablet 3    nitroGLYCERIN (NITROSTAT) 0.4 MG SL tablet up to max of 3 total doses.  If no relief after 1 dose, call 911. 25 tablet 3    levothyroxine (SYNTHROID) 100 MCG tablet Take 1 tablet by mouth Daily (Patient taking differently: Take 150 mcg by mouth Daily ) 90 tablet 0    vitamin D3 (CHOLECALCIFEROL) 400 units TABS tablet Take 400 Units by mouth daily      vitamin E 400 UNIT capsule Take 400 Units by mouth daily      magnesium oxide (MAG-OX) 400 MG tablet TAKE 1 TABLET BY MOUTH DAILY 90 tablet 1    Multiple Vitamins-Minerals (THERAPEUTIC MULTIVITAMIN-MINERALS) tablet Take 1 tablet by mouth daily       No current facility-administered medications for this visit. Physical Examination      /70   Pulse 74   Resp 20   Ht 6' 2.5\" (1.892 m)   Wt (!) 334 lb (151.5 kg)   BMI 42.31 kg/m²     General: No acute distress, appears as stated age, nonicteric  Head: Atraumatic, no gross abnormalities or bruises  Neck: Supple and nontender, no carotid bruits, JVP normal  Lungs: Clear to auscultation bilaterally, no wheezes, rales, or rhonchi  Heart: Regular rate and rhythm, no murmurs, rubs, or gallops  Abdomen: Soft, nontender, nondistended, normal bowel sounds  Extremities: No obvious deformities, no cyanosis, no pitting edema  Neurological: Alert and oriented x3, EOMI, moving all extremities x4  Psychological: Normal mood and affect, cooperative  Skin: Color, texture, and turgor normal for age          Labs/Imaging/Diagnostics     Lab Results   Component Value Date     02/02/2022    K 4.6 02/02/2022    K 4.2 10/07/2020    CL 99 02/02/2022    CO2 21 02/02/2022    BUN 33 02/02/2022    CREATININE 1.6 02/02/2022    GLUCOSE 135 02/02/2022    CALCIUM 9.3 02/02/2022        Estimated Creatinine Clearance: 78 mL/min (A) (based on SCr of 1.6 mg/dL (H)).     Lab Results   Component Value Date    WBC 9.9 10/12/2020    HGB 11.5 (L) 10/12/2020    HCT 40.3 10/12/2020    MCV 90.6 10/12/2020     10/12/2020       Lab Results   Component Value Date    ALT 19 01/28/2021    AST 17 01/28/2021    ALKPHOS 109 01/28/2021    BILITOT 0.6 01/28/2021       Lab Results   Component Value Date    LABALBU 4.4 01/28/2021       Lab Results   Component Value Date    CHOL 128 01/28/2021    CHOL 169 07/15/2019    CHOL 164 06/01/2017     Lab Results   Component Value Date    TRIG 250 (H) 01/28/2021    TRIG 193 (H) 07/15/2019    TRIG 257 (H) 06/01/2017     Lab Results   Component Value Date    HDL 33 01/28/2021    HDL 40 07/15/2019    HDL 34 06/08/2018     Lab Results   Component Value Date    LDLCALC 45 01/28/2021    LDLCALC 90 07/15/2019    LDLCALC 73 06/08/2018     Lab Results   Component Value Date    LABVLDL 50 01/28/2021    LABVLDL 39 07/15/2019    LABVLDL 17 06/08/2018     No results found for: CHOLHDLRATIO    Lab Results   Component Value Date    TROPONINI 0.15 (H) 10/08/2020       No results found for: BNP      Lab Results   Component Value Date    LABA1C 6.6 (H) 01/28/2021     No results found for: EAG     Pertinent Cardiovascular Studies:  EKG today: Normal sinus rhythm with age-indeterminate anteroseptal infarct. First-degree AV block     Assessment and Plan:        65-year-old  male with a history noted above. He has no angina and he is NYHA class I and euvolemic. He has no symptoms of arrhythmia. His CKD has progressed somewhat. Diagnosis Orders   1. Chronic heart failure with preserved ejection fraction (HFpEF) ([facility])  ECHO LIMITED   2. Coronary artery disease involving native coronary artery of native heart without angina pectoris        · Continue furosemide 40 mg p.o. once daily. I hope that within the next few months we will be able to take him off of furosemide entirely  · Continue sacubitril/valsartan  mg p.o. twice daily  · Continue carvedilol 25 mg p.o. twice daily  · Continue spironolactone 25 mg daily  · Continue empagliflozin  · Continue amlodipine 5 mg daily. · Continue aspirin and atorvastatin as is  · Limited TTE for function and MR  · Continue to follow-up with CHF clinic       Follow up with me in 12 months.   Sooner if needed    We appreciate the opportunity to participate in His care!       Gregg Ahn MD, [facility]  Interventional Cardiology & Structural Heart Disease  Office: [phone]  Fax: [phone]  General Coordinator: Adonna Schaumann

## 2022-02-07 NOTE — Clinical Note
He is doing very well. I am worried about the creep up of his creatinine. I asked him to see you in October and I will see him a few months after that.   I suspect he may be able to come off of the Lasix hopefully within the next few months

## 2022-02-07 NOTE — TELEPHONE ENCOUNTER
----- Message from BRITTNEY Treviño CNP sent at 2/4/2022  2:27 PM EST -----  Please have him decrease norvasc to 5 mg daily, will wean off.      Then will determine if he needs improved BP control either titrate Coreg if HR allows or add hydralazine/isordil    Thank you

## 2022-02-07 NOTE — TELEPHONE ENCOUNTER
Left provider instructions in VM. Requested call back to cardiology office to confirm understanding. Any staff member can confirm and close this encounter.

## 2022-02-08 RX ORDER — SACUBITRIL AND VALSARTAN 97; 103 MG/1; MG/1
1 TABLET, FILM COATED ORAL 2 TIMES DAILY
Qty: 180 TABLET | Refills: 2 | Status: SHIPPED | OUTPATIENT
Start: 2022-02-08

## 2022-02-09 ENCOUNTER — TELEPHONE (OUTPATIENT)
Dept: CARDIOLOGY CLINIC | Age: 62
End: 2022-02-09

## 2022-02-09 DIAGNOSIS — Z79.899 MEDICATION DOSE DECREASED: ICD-10-CM

## 2022-02-09 DIAGNOSIS — I50.22 CHRONIC SYSTOLIC HEART FAILURE (HCC): Primary | ICD-10-CM

## 2022-02-09 RX ORDER — FUROSEMIDE 20 MG/1
20 TABLET ORAL DAILY
Qty: 90 TABLET | Refills: 1 | Status: SHIPPED | OUTPATIENT
Start: 2022-02-09

## 2022-02-21 ENCOUNTER — TELEPHONE (OUTPATIENT)
Dept: CARDIOLOGY CLINIC | Age: 62
End: 2022-02-21

## 2022-02-21 NOTE — TELEPHONE ENCOUNTER
Pt's wife called with bps    February 13th= 79 hr, 120/67 bp    15th= 81 hr, 102/67 bp    16th= 80 hr, 105/81 bp    17th= 80 hr,122/97 bp    19th= 80hr, 120/70 bp

## 2022-03-02 ENCOUNTER — HOSPITAL ENCOUNTER (OUTPATIENT)
Age: 62
Discharge: HOME | End: 2022-03-02
Payer: COMMERCIAL

## 2022-03-02 DIAGNOSIS — Z79.899 MEDICATION DOSE DECREASED: ICD-10-CM

## 2022-03-02 DIAGNOSIS — I50.22 CHRONIC SYSTOLIC HEART FAILURE (HCC): ICD-10-CM

## 2022-03-02 LAB
ANION GAP SERPL CALCULATED.3IONS-SCNC: 12 MMOL/L (ref 7–16)
BUN BLDV-MCNC: 33 MG/DL (ref 6–20)
CALCIUM SERPL-MCNC: 9.2 MG/DL (ref 8.6–10.2)
CHLORIDE BLD-SCNC: 101 MMOL/L (ref 98–107)
CO2: 25 MMOL/L (ref 22–29)
CREAT SERPL-MCNC: 1.5 MG/DL (ref 0.7–1.2)
GFR AFRICAN AMERICAN: 58
GFR NON-AFRICAN AMERICAN: 48 ML/MIN/1.73
GLUCOSE BLD-MCNC: 119 MG/DL (ref 74–99)
POTASSIUM SERPL-SCNC: 4.3 MMOL/L (ref 3.5–5)
PRO-BNP: 104 PG/ML (ref 0–125)
SODIUM BLD-SCNC: 138 MMOL/L (ref 132–146)

## 2022-03-02 PROCEDURE — 83880 ASSAY OF NATRIURETIC PEPTIDE: CPT

## 2022-03-02 PROCEDURE — 80048 BASIC METABOLIC PNL TOTAL CA: CPT

## 2022-03-02 PROCEDURE — 36415 COLL VENOUS BLD VENIPUNCTURE: CPT

## 2022-03-04 ENCOUNTER — TELEPHONE (OUTPATIENT)
Dept: CARDIOLOGY CLINIC | Age: 62
End: 2022-03-04

## 2022-03-04 DIAGNOSIS — I50.22 CHRONIC SYSTOLIC HEART FAILURE (HCC): Primary | ICD-10-CM

## 2022-03-04 DIAGNOSIS — Z79.899 MEDICATION DOSE DECREASED: ICD-10-CM

## 2022-03-04 RX ORDER — AMLODIPINE BESYLATE 5 MG/1
5 TABLET ORAL DAILY
Qty: 30 TABLET | Refills: 2 | Status: SHIPPED
Start: 2022-03-04 | End: 2022-03-09 | Stop reason: ALTCHOICE

## 2022-03-04 NOTE — RESULT ENCOUNTER NOTE
Labs reviewed  How is patient feeling since decreasing Lasix   Slight rise in pro-bnp     Labs reviewed, stable      Current GDMT:  Entresto 97/103 mg BID  Coreg 25 mg BID  Spironolactone 25 mg daily   Jardiance 25 mg daily   Lasix decreased to 20 mg daily (2/9/2022)    Vitals (2/7/2022) /70   Pulse 74    Pt's wife called with bps  February 13th= 79 hr, 120/67 bp  15th= 81 hr, 102/67 bp  16th= 80 hr, 105/81 bp  17th= 80 hr,122/97 bp  19th= 80hr, 120/70 bp      Decrease norvasc to 5 mg daily   Follow up vitals in 2 weeks  Follow up labs  in 1 month    Thank you

## 2022-03-07 ENCOUNTER — TELEPHONE (OUTPATIENT)
Dept: CARDIOLOGY CLINIC | Age: 62
End: 2022-03-07

## 2022-03-07 NOTE — TELEPHONE ENCOUNTER
Pt wife called and they got the message to decrease the norvasc. She said they were told to do that in February and have been doing that ever since.

## 2022-03-08 NOTE — TELEPHONE ENCOUNTER
Miranda Irwin, please ensure [facility] gets put on a recall for Jo Prom per Dr Oscar Samples below instructions please.

## 2022-03-08 NOTE — TELEPHONE ENCOUNTER
Currently on 5mg norvasc daily. Current Outpatient Medications   Medication Sig Dispense Refill    amLODIPine (NORVASC) 5 MG tablet Take 1 tablet by mouth daily 30 tablet 2    furosemide (LASIX) 20 MG tablet Take 1 tablet by mouth daily 90 tablet 1    sacubitril-valsartan (ENTRESTO)  MG per tablet Take 1 tablet by mouth 2 times daily 180 tablet 2    zinc 50 MG TABS tablet Take 50 mg by mouth daily      atorvastatin (LIPITOR) 40 MG tablet Take 1 tablet by mouth daily 90 tablet 3    empagliflozin (JARDIANCE) 25 MG tablet Take 25 mg by mouth daily      carvedilol (COREG) 25 MG tablet Take 25 mg by mouth 2 times daily (with meals)      aspirin EC 81 MG EC tablet Take 1 tablet by mouth daily 90 tablet 3    spironolactone (ALDACTONE) 25 MG tablet Take 1 tablet by mouth daily 90 tablet 3    nitroGLYCERIN (NITROSTAT) 0.4 MG SL tablet up to max of 3 total doses. If no relief after 1 dose, call 911. 25 tablet 3    levothyroxine (SYNTHROID) 100 MCG tablet Take 1 tablet by mouth Daily (Patient taking differently: Take 150 mcg by mouth Daily ) 90 tablet 0    vitamin D3 (CHOLECALCIFEROL) 400 units TABS tablet Take 400 Units by mouth daily      vitamin E 400 UNIT capsule Take 400 Units by mouth daily      magnesium oxide (MAG-OX) 400 MG tablet TAKE 1 TABLET BY MOUTH DAILY 90 tablet 1    Multiple Vitamins-Minerals (THERAPEUTIC MULTIVITAMIN-MINERALS) tablet Take 1 tablet by mouth daily       No current facility-administered medications for this visit.

## 2022-03-09 NOTE — TELEPHONE ENCOUNTER
10-28-21 norvasc 10mg daily  2 7 22  norvasc was updated to reflect 5mg daily see below. Again 3 /4 was updated to reflect 5mg daily. Pamela Davey [phone] (home)  spoke with wife Rene Howard    Stop norvasc. No edema. Wife encouraged daily weights and diet compliance. He's doing well  She will call next week bp's. In 2 weeks is due for labs. Removed from profile. Prieto Trujillo RN       8:50 AM Called [facility] dc'd all amlodipine scripts.      Electronically signed by Prieto Trujillo RN on 3/9/2022 at 8:53 AM

## 2022-03-09 NOTE — TELEPHONE ENCOUNTER
Medication list was not updated to reflect he was on Norvasc 5mg versus 10mg  Please have him stop norvasc  Please have wife call in BP reading in 1 week     Pending follow up labs off norvasc, if needed start hydralazine    Thank you

## 2022-03-24 RX ORDER — ATORVASTATIN CALCIUM 40 MG/1
40 TABLET, FILM COATED ORAL DAILY
Qty: 90 TABLET | Refills: 3 | Status: SHIPPED | OUTPATIENT
Start: 2022-03-24

## 2022-03-24 NOTE — TELEPHONE ENCOUNTER
CHUCK SALCIDO CALLED IN WITH THE BP AND HR READINGS.   3/12/22    HR 73       113/76  3/13/22    HR 80       109/76  3/15/22    HR 82       110/72  3/17/22    HR 80       120/78    I SPOKE WITH HIS WIFE OMARI.  [phone]

## 2022-03-25 NOTE — TELEPHONE ENCOUNTER
Please let wife know that vitals look good  Continue current management  Has follow up in CHF clinic scheduled    Thank you

## 2022-03-25 NOTE — TELEPHONE ENCOUNTER
Future Appointments   Date Time Provider Jacob Busch   4/18/2022  2:00 PM [facility] CHF ROOM 1 SEYZ CHF [facility]   4/27/2022  7:00 AM [facility] ECHO RM 1 SEYZ [address] with wife , updated on L Laura VILLA advice.      Ja Jeffrey RN

## 2022-04-18 ENCOUNTER — HOSPITAL ENCOUNTER (OUTPATIENT)
Age: 62
Discharge: HOME | End: 2022-04-18
Payer: COMMERCIAL

## 2022-04-18 ENCOUNTER — HOSPITAL ENCOUNTER (OUTPATIENT)
Dept: OTHER | Age: 62
Setting detail: THERAPIES SERIES
Discharge: HOME | End: 2022-04-18
Payer: COMMERCIAL

## 2022-04-18 VITALS
SYSTOLIC BLOOD PRESSURE: 145 MMHG | WEIGHT: 315 LBS | HEART RATE: 92 BPM | BODY MASS INDEX: 43.45 KG/M2 | OXYGEN SATURATION: 94 % | DIASTOLIC BLOOD PRESSURE: 80 MMHG | RESPIRATION RATE: 18 BRPM

## 2022-04-18 LAB
ALBUMIN SERPL-MCNC: 4.3 G/DL (ref 3.5–5.2)
ALP BLD-CCNC: 123 U/L (ref 40–129)
ALT SERPL-CCNC: 23 U/L (ref 0–40)
ANION GAP SERPL CALCULATED.3IONS-SCNC: 16 MMOL/L (ref 7–16)
ANION GAP SERPL CALCULATED.3IONS-SCNC: 9 MMOL/L (ref 7–16)
AST SERPL-CCNC: 15 U/L (ref 0–39)
BILIRUB SERPL-MCNC: 0.5 MG/DL (ref 0–1.2)
BUN BLDV-MCNC: 24 MG/DL (ref 6–23)
BUN BLDV-MCNC: 25 MG/DL (ref 6–23)
CALCIUM SERPL-MCNC: 9.5 MG/DL (ref 8.6–10.2)
CALCIUM SERPL-MCNC: 9.5 MG/DL (ref 8.6–10.2)
CHLORIDE BLD-SCNC: 101 MMOL/L (ref 98–107)
CHLORIDE BLD-SCNC: 104 MMOL/L (ref 98–107)
CHOLESTEROL, TOTAL: 139 MG/DL (ref 0–199)
CO2: 20 MMOL/L (ref 22–29)
CO2: 27 MMOL/L (ref 22–29)
CREAT SERPL-MCNC: 1.4 MG/DL (ref 0.7–1.2)
CREAT SERPL-MCNC: 1.5 MG/DL (ref 0.7–1.2)
CREATININE URINE: 81 MG/DL (ref 40–278)
GFR AFRICAN AMERICAN: 58
GFR AFRICAN AMERICAN: >60
GFR NON-AFRICAN AMERICAN: 48 ML/MIN/1.73
GFR NON-AFRICAN AMERICAN: 52 ML/MIN/1.73
GLUCOSE BLD-MCNC: 177 MG/DL (ref 74–99)
GLUCOSE BLD-MCNC: 223 MG/DL (ref 74–99)
HBA1C MFR BLD: 7.3 % (ref 4–5.6)
HDLC SERPL-MCNC: 39 MG/DL
LDL CHOLESTEROL CALCULATED: 54 MG/DL (ref 0–99)
MICROALBUMIN UR-MCNC: <12 MG/L
MICROALBUMIN/CREAT UR-RTO: ABNORMAL (ref 0–30)
POTASSIUM SERPL-SCNC: 4.5 MMOL/L (ref 3.5–5)
POTASSIUM SERPL-SCNC: 4.9 MMOL/L (ref 3.5–5)
PRO-BNP: 87 PG/ML (ref 0–125)
SODIUM BLD-SCNC: 137 MMOL/L (ref 132–146)
SODIUM BLD-SCNC: 140 MMOL/L (ref 132–146)
T4 FREE: 1.21 NG/DL (ref 0.93–1.7)
TOTAL PROTEIN: 8 G/DL (ref 6.4–8.3)
TRIGL SERPL-MCNC: 228 MG/DL (ref 0–149)
TSH SERPL DL<=0.05 MIU/L-ACNC: 3.35 UIU/ML (ref 0.27–4.2)
VLDLC SERPL CALC-MCNC: 46 MG/DL

## 2022-04-18 PROCEDURE — 82570 ASSAY OF URINE CREATININE: CPT

## 2022-04-18 PROCEDURE — 83721 ASSAY OF BLOOD LIPOPROTEIN: CPT

## 2022-04-18 PROCEDURE — 99214 OFFICE O/P EST MOD 30 MIN: CPT

## 2022-04-18 PROCEDURE — 80061 LIPID PANEL: CPT

## 2022-04-18 PROCEDURE — 36415 COLL VENOUS BLD VENIPUNCTURE: CPT

## 2022-04-18 PROCEDURE — 80053 COMPREHEN METABOLIC PANEL: CPT

## 2022-04-18 PROCEDURE — 83036 HEMOGLOBIN GLYCOSYLATED A1C: CPT

## 2022-04-18 PROCEDURE — 83880 ASSAY OF NATRIURETIC PEPTIDE: CPT

## 2022-04-18 PROCEDURE — 80048 BASIC METABOLIC PNL TOTAL CA: CPT

## 2022-04-18 PROCEDURE — 84439 ASSAY OF FREE THYROXINE: CPT

## 2022-04-18 PROCEDURE — 82044 UR ALBUMIN SEMIQUANTITATIVE: CPT

## 2022-04-18 PROCEDURE — 84443 ASSAY THYROID STIM HORMONE: CPT

## 2022-04-18 NOTE — PROGRESS NOTES
[address] [facility]   1960            Referring Doctor:   Primary Care Physician: Dr. Abraham Wood  Cardiologist: Dr. Flaquito Patel  Nephrologist:         History of Present Illness:     Alejandro Finch is a 63 y.o. male with a history of HFpEF, most recent EF 60-65% on 10/10/20. Patient Story:    He does not  have dyspnea with exertion, shortness of breath, or decline in overall functional capacity. He does not have orthopnea, PND, nocturnal cough or hemoptysis. He does not have abdominal distention or bloating, early satiety, anorexia/change in appetite. He does has a good urinary response to  oral diuretic. He does have  lower extremity edema. He denies lightheadedness, dizziness. He denies palpitations, syncope or near syncope. He does not complain of chest pain, pressure, discomfort. No Known Allergies      No outpatient medications have been marked as taking for the 4/18/22 encounter [facility] Encounter) with [facility] CHF ROOM 1.           Guideline directed medical:  ARNI/ACE I/ARB: Yes  Beta blocker:   Yes  Aldosterone antagonist:  Yes        Physical Examination:     BP (!) 145/80   Pulse 92   Resp 18   Wt (!) 343 lb (155.6 kg)   SpO2 94%   BMI 43.45 kg/m²     Assessment  Charting Type: Shift assessment (chf clinic)                   Respiratory  Respiratory Pattern: Regular  Respiratory Depth: Normal  Respiratory Quality/Effort: Unlabored  Chest Assessment: Chest expansion symmetrical,Trachea midline  L Breath Sounds: Clear,Diminished  R Breath Sounds: Clear,Diminished              Cardiac  Cardiac Regularity: Regular  Cardiac Rhythm: Sinus rhythm    Rhythm Interpretation  Pulse: 92                         Peripheral Vascular  Peripheral Vascular (WDL): Exceptions to WDL  RLE Edema: +1,Pitting  LLE Edema: Trace                                                 Pulse: 92                   LAB DATA:    BNP  No results for input(s): BNP in the last 72 hours.    proBNP  No results for input(s): PROBNP in the last 72 hours. BMP  Recent Labs     04/18/22  0905      K 4.9      CO2 27   BUN 24*   CREATININE 1.5*   GLUCOSE 177*   CALCIUM 9.5         WEIGHTS:  Wt Readings from Last 3 Encounters:   04/18/22 (!) 343 lb (155.6 kg)   02/07/22 (!) 334 lb (151.5 kg)   10/18/21 (!) 320 lb (145.2 kg)         TELEMETRY:  Cardiac Regularity: Regular  Cardiac Rhythm/Interpretation: NSR        ASSESSMENT:  Andrea Daily weight is up significantly since last visit in October. Patient aware he has been gaining weight and admits he has not been watching his diet. Now agreeable to start eating better since holidays are over. Patient does not complain of any sob and states he feels good. Labs taken. Interventions completed this visit:  IV diuretics given no  Lab work obtained yes, BMP, BNP   Reviewed continue current medications that patient as prescribed answered any questions   Educated on signs and symptoms of HF  Educated on low sodium diet    PLAN:  Given clinic phone number and aware of signs and symptoms to call with any HF change in symptoms.

## 2022-04-21 LAB
Lab: NORMAL
REPORT: NORMAL
THIS TEST SENT TO: NORMAL

## 2022-06-29 ENCOUNTER — HOSPITAL ENCOUNTER (OUTPATIENT)
Dept: CARDIOLOGY | Age: 62
Discharge: HOME | End: 2022-06-29
Payer: COMMERCIAL

## 2022-06-29 DIAGNOSIS — I50.32 CHRONIC HEART FAILURE WITH PRESERVED EJECTION FRACTION (HFPEF) (HCC): ICD-10-CM

## 2022-06-29 PROCEDURE — 6360000004 HC RX CONTRAST MEDICATION: Performed by: INTERNAL MEDICINE

## 2022-06-29 PROCEDURE — 2580000003 HC RX 258: Performed by: INTERNAL MEDICINE

## 2022-06-29 PROCEDURE — 93308 TTE F-UP OR LMTD: CPT

## 2022-06-29 RX ORDER — SODIUM CHLORIDE 0.9 % (FLUSH) 0.9 %
10 SYRINGE (ML) INJECTION PRN
Status: DISCONTINUED | OUTPATIENT
Start: 2022-06-29 | End: 2022-06-30 | Stop reason: HOSPADM

## 2022-06-29 RX ADMIN — SODIUM CHLORIDE, PRESERVATIVE FREE 10 ML: 5 INJECTION INTRAVENOUS at 09:33

## 2022-06-29 RX ADMIN — PERFLUTREN 1.1 MG: 6.52 INJECTION, SUSPENSION INTRAVENOUS at 09:34

## 2022-06-29 RX ADMIN — SODIUM CHLORIDE, PRESERVATIVE FREE 10 ML: 5 INJECTION INTRAVENOUS at 09:39

## 2022-07-07 ENCOUNTER — TELEPHONE (OUTPATIENT)
Dept: CARDIOLOGY CLINIC | Age: 62
End: 2022-07-07

## 2022-07-07 NOTE — TELEPHONE ENCOUNTER
----- Message from Erika Hannon MD sent at 7/1/2022  1:43 PM EDT -----  Looks great.  Normal EF and trace MR

## 2022-07-14 ENCOUNTER — OFFICE VISIT (OUTPATIENT)
Dept: CARDIOLOGY CLINIC | Age: 62
End: 2022-07-14
Payer: COMMERCIAL

## 2022-07-14 VITALS
RESPIRATION RATE: 16 BRPM | OXYGEN SATURATION: 98 % | HEART RATE: 75 BPM | DIASTOLIC BLOOD PRESSURE: 66 MMHG | WEIGHT: 315 LBS | HEIGHT: 75 IN | SYSTOLIC BLOOD PRESSURE: 122 MMHG | BODY MASS INDEX: 39.17 KG/M2

## 2022-07-14 DIAGNOSIS — I25.10 CORONARY ARTERY DISEASE INVOLVING NATIVE CORONARY ARTERY OF NATIVE HEART WITHOUT ANGINA PECTORIS: ICD-10-CM

## 2022-07-14 DIAGNOSIS — G47.33 OSA (OBSTRUCTIVE SLEEP APNEA): Primary | ICD-10-CM

## 2022-07-14 PROCEDURE — 93000 ELECTROCARDIOGRAM COMPLETE: CPT | Performed by: INTERNAL MEDICINE

## 2022-07-14 PROCEDURE — 99214 OFFICE O/P EST MOD 30 MIN: CPT | Performed by: NURSE PRACTITIONER

## 2022-07-14 RX ORDER — NITROGLYCERIN 0.4 MG/1
TABLET SUBLINGUAL
Qty: 25 TABLET | Refills: 3 | Status: SHIPPED | OUTPATIENT
Start: 2022-07-14

## 2022-07-14 RX ORDER — LEVOTHYROXINE SODIUM 0.15 MG/1
TABLET ORAL
COMMUNITY
Start: 2022-06-15

## 2022-07-14 NOTE — PROGRESS NOTES
[facility] [place] Cardiology  Office Visit         Reason for Visit: Heart failure    Primary Cardiologist: Dr. Williams Paul        History of Present Illness:     Mr. Romario Engel is a 63year old male with a PMHx of chronic HFpEF, CAD, HTN, CKD, morbid obesity, T2DM. He presents today as an acute add-on, since his last appointment he denies any urgent care visits or hospitalizations. He has been compliant with all of his current cardiac medications and states that he has been feeling great! He added on today's appointment as he had an issue with urination at a recent job site. 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 He recently had a urinary accident after not urinating for approximately 8 hours on the job that resulted in him being laid off. As we discussed further this does not seem to be an issue with increased/frequent urination rather an issue with proper breaks from work to be able to urinate. From a physical standpoint he has noted increased functional capacity, improvement in his symptoms and the best he has felt in several years therefore will not adjust current medical regimen but we discussed alternatives such as brief usage while on a job site. He is also going to speak with his [employer] alfaro about the incident.        Patient Active Problem List    Diagnosis Date Noted    Chronic heart failure with preserved ejection fraction (HFpEF) ([address] 02/07/2022    Chronic systolic heart failure ([address] 12/08/2020    Coronary artery disease involving native coronary artery of native heart without angina pectoris 12/08/2020    Acute on chronic diastolic heart failure (HCC)     Fluid overload 10/08/2020    Congestive heart failure ([address]     Localized edema due to fluid overload 10/07/2020    Type 2 diabetes mellitus without complication, without long-term current use of insulin ([address] 09/26/2019    Class 3 severe obesity due to excess calories without serious comorbidity with body mass index (BMI) of 45.0 to 49.9 in adult [facility]) 09/26/2019    Essential hypertension 09/26/2019    Hyperglycemia 09/26/2019    Acquired hypothyroidism 02/29/2016    CRI (chronic renal insufficiency) 33/24/1263    Diastolic dysfunction 76/36/7684     Echo 2-16 stage 3      Accelerated hypertension 02/18/2016     Past Medical/surgical history:    1. HFpEF  2. Echocardiogram 2/19/2016 [facility]):  Normal left ventricle size and systolic function EF 95%,   Moderate concentric left ventricular hypertrophy   Moderately dilated left atrium  Moderately dilated right  Ventricle  Borderline reduced right ventricle systolic function  Markedly  enlarged right atrium size.  Mild mitral regurgitation  Moderate tricuspid regurgitation.  RVSP is 55 mmHg.  Stage III diastolic dysfunction  3. [facility] nuclear medicine stress test 2/28/2016: Small fixed perfusion defect over the apical left ventricular myocardium with hypokinetic wall motion suggestive of scarring, no reversible defect noted. EF calculated at 55%  4. Hypertension  5. Hypothyroidism  6. Chronic kidney disease  7. Non-insulin-requiring type 2 diabetes mellitus, A1c 7.7 5/22/2020  8. Lifelong non-smoker  9. TTE (10/10/2020, Dr. Jules Norwood) Summary:  Normal left ventricle size and systolic function. Ejection fraction is visually estimated at 60-65%. Distal anteroseputum and apex are hypokinetic. Mild concentric left ventricular hypertrophy. Stage II diastolic dysfunction. The left atrium is severely dilated. Normal right ventricular size and function. TAPSE 26 mm. Physiologic and/or trace mitral regurgitation is present. No hemodynamically significant aortic stenosis is present. Unable to estimate PA systolic pressure. No evidence for hemodynamically significant pericardial effusion. Technically difficult examination due to body habitus. Definity echo  contrast was used to delineate endocardial borders.   10. C (10/12/2020, Dr. Charla Mcclain) Coronary anatomy: Dominance: Right. Left main: Intragraphically normal. LAD: Gives off a large first diagonal and is followed by a chronic total occlusion in the midsegment. The occlusion appears to be short, nontortuous, not heavily calcified, with a very good quality distal vessel based on collateral circulation from the RCA. This is consistent with J- score of 1 due to the blunt stump. Moderate-sized ramus intermedius with mild diffuse disease. Left circumflex: Gives off a dominant OM. Mild diffuse disease. RCA: Large dominant vessel with large RPDA and large RPL. The RPDA gives prominent septal collaterals to the LAD. The RPL appears to give faint epicardial collaterals to a diagonal. Hemodynamics: LVEDP 26 Ao: 144/87 with a mean of 112. Conclusions: Mid LAD  with prominent right to left septal collaterals from the RPDA. J- score 1 for blunt stump Otherwise mild diffuse CAD. Elevated left filling pressure. PLAN (per Dr. Johnson Poster): The LAD distribution appears viable on resting TTE. Given his young age I recommend LAD revascularization.  PCI of the LAD appears feasible with features generally consistent with high success rate. I am happy to see him in consult regarding that.       Past Medical History:   Diagnosis Date    Arthritis     knees    CHF (congestive heart failure) (HCC)     CKD (chronic kidney disease)     DM (diabetes mellitus) [facility]     Hyperlipidemia     Hypertension     Hypothyroidism     Thyroid nodule 11/07/2020       Past Surgical History:   Procedure Laterality Date    CARDIAC CATHETERIZATION  10/12/2020         No Known Allergies      Outpatient Medications Marked as Taking for the 7/14/22 encounter (Office Visit) with BRITTNEY Ramey CNP   Medication Sig Dispense Refill    Cholecalciferol (VITAMIN D3) 125 MCG (5000 UT) TABS Take by mouth daily      levothyroxine (SYNTHROID) 150 MCG tablet TAKE 1 TABLET BY MOUTH EVERY DAY      atorvastatin (LIPITOR) 40 MG tablet Take 1 tablet by mouth daily 90 tablet 3    furosemide (LASIX) 20 MG tablet Take 1 tablet by mouth daily 90 tablet 1    sacubitril-valsartan (ENTRESTO)  MG per tablet Take 1 tablet by mouth 2 times daily 180 tablet 2    zinc 50 MG TABS tablet Take 50 mg by mouth daily      empagliflozin (JARDIANCE) 25 MG tablet Take 25 mg by mouth daily      carvedilol (COREG) 25 MG tablet Take 25 mg by mouth 2 times daily (with meals)      aspirin EC 81 MG EC tablet Take 1 tablet by mouth daily 90 tablet 3    spironolactone (ALDACTONE) 25 MG tablet Take 1 tablet by mouth daily 90 tablet 3    nitroGLYCERIN (NITROSTAT) 0.4 MG SL tablet up to max of 3 total doses. If no relief after 1 dose, call 911. 25 tablet 3    vitamin E 400 UNIT capsule Take 1,000 Units by mouth daily       magnesium oxide (MAG-OX) 400 MG tablet TAKE 1 TABLET BY MOUTH DAILY 90 tablet 1    Multiple Vitamins-Minerals (THERAPEUTIC MULTIVITAMIN-MINERALS) tablet Take 1 tablet by mouth daily           Review of Systems:   Cardiac: As per HPI  General: No fever, chills, rigors  Pulmonary: As per HPI  HEENT: No visual disturbances, difficult swallowing  GI: No nausea, vomiting, abdominal pain  : No dysuria or hematuria  Endocrine: No thyroid disease or diabetes  Musculoskeletal: RAINES x 4, no focal motor deficits  Skin: Intact, no rashes  Neuro/Psych: No headache or seizures      Weights: Wt Readings from Last 3 Encounters:   07/14/22 (!) 336 lb (152.4 kg)   04/18/22 (!) 343 lb (155.6 kg)   02/07/22 (!) 334 lb (151.5 kg)         Physical Examination:     /66 (Site: Right Upper Arm, Position: Sitting, Cuff Size: Medium Adult)   Ht 6' 2.5\" (1.892 m)   Wt (!) 336 lb (152.4 kg)   SpO2 98%   BMI 42.56 kg/m²     CONSTITUTIONAL: Alert and oriented times 3, no acute distress and cooperative to examination with proper mood and affect. SKIN: Skin color, texture, turgor normal. No rashes or lesions.   LYMPH: no cervical nodes, no inguinal nodes  HEENT: Head is normocephalic, atraumatic. EOMI, PERRLA. NECK: Supple, symmetrical, trachea midline, no adenopathy, thyroid symmetric, not enlarged and no tenderness, skin normal.  CHEST/LUNGS: chest symmetric with normal A/P diameter, normal respiratory rate and rhythm, lungs clear to auscultation without wheezes, rales or rhonchi. No accessory muscle use. Scars None   CARDIOVASCULAR: Heart sounds are normal.  Regular rate and rhythm without murmur, gallop or rub. Normal S1 and S2. . Carotid and femoral pulses 2+/4 and equal bilaterally. ABDOMEN: Obese. No and Laparoscopic scar(s) present. Normal bowel sounds. No bruits. soft, nondistended, no masses or organomegaly. no evidence of hernia. Percussion: Normal without hepatosplenomegally. Tenderness: absent. RECTAL: deferred, not clinically indicated  NEUROLOGIC: There are no focalizing motor or sensory deficits. CN II-XII are grossly intact. Vearl Pippins EXTREMITIES: no cyanosis, no clubbing and no edema. All the following diagnostics were personally reviewed and interpreted by me. LAB DATA:     3/2/2022 15:53 4/18/2022 09:05 4/18/2022 14:25   Sodium 138 137 140   Potassium 4.3 4.9 4.5   Chloride 101 101 104   CO2 25 27 20 (L)   BUN,BUNPL 33 (H) 24 (H) 25 (H)   Creatinine 1.5 (H) 1.5 (H) 1.4 (H)   Anion Gap 12 9 16   GFR Non- 48 48 52   GFR  58 58 >60   GLUCOSE, FASTING, (H) 177 (H) 223 (H)   CALCIUM, SERUM, 358309 9.2 9.5 9.5   Total Protein  8.0    Pro-  87       IMAGING:    N/A    CARDIAC TESTING:    TTE (10/10/2020, Dr. Citlaly Sow)   Summary:   Normal left ventricle size and systolic function. Ejection fraction is visually estimated at 60-65%. Distal anteroseputum and apex are hypokinetic. Mild concentric left ventricular hypertrophy. Stage II diastolic dysfunction. The left atrium is severely dilated. Normal right ventricular size and function. TAPSE 26 mm.    Physiologic and/or trace mitral regurgitation is present. No hemodynamically significant aortic stenosis is present. Unable to estimate PA systolic pressure. No evidence for hemodynamically significant pericardial effusion. Technically difficult examination due to body habitus. Definity echo   contrast was used to delineate endocardial borders. [facility] (10/12/2020, Dr. Nando Hagen)  Coronary anatomy:  Dominance: Right  1. Left main: Intragraphically normal  2. LAD: Gives off a large first diagonal and is followed by a chronic total occlusion in the midsegment. The occlusion appears to be short, nontortuous, not heavily calcified, with a very good quality distal vessel based on collateral circulation from the RCA. This is consistent with J- score of 1 due to the blunt stump. 3. Moderate-sized ramus intermedius with mild diffuse disease  4. Left circumflex: Gives off a dominant OM. Mild diffuse disease  5. RCA: Large dominant vessel with large RPDA and large RPL. The RPDA gives prominent septal collaterals to the LAD. The RPL appears to give faint epicardial collaterals to a diagonal.  Hemodynamics:  LVEDP 26  Ao: 144/87 with a mean of 112  Conclusions:  1. Mid LAD  with prominent right to left septal collaterals from the RPDA. J- score 1 for blunt stump  2. Otherwise mild diffuse CAD  3. Elevated left filling pressure  PLAN (per Dr. Nando Hagen):  1. The LAD distribution appears viable on resting TTE. Given his young age I recommend LAD revascularization.  PCI of the LAD appears feasible with features generally consistent with high success rate. I am happy to see him in consult regarding that. Cardiac MRI (11/2020, [facility])  IMPRESSION:   1. The left ventricle is dilated (EDVi = 114 cc/m2 ), and has mildly   reduced function (LVEF = 44 %).  There is regional wall motion   abnormality with severe hypokinesia of the mid to apical anterior and   apical septal walls including the apex.    -During stress perfusion, there is a hypointense rim along the mid to apical anterior and anteroseptal segments, which is also seen during rest   imaging, consistent with ischemic damage. Delayed-enhancement imaging   also reveals ischemic scar in a LAD territory distribution as discussed   in the body of report.  Majority of the areas in the LCx and RCA   territories are viable. 2. The right ventricle is normal in size and shape (EDVi = 96 cc/m2), and   has normal function (RVEF = 64 %). 3. Mild mitral regurgitation by quantitative assessment. He went to [facility] for second opinion regarding LAD  PCI, he underwent the CMR at [facility] which revealed nonviable myocardium in the mid to distal LAD distribution.  PCI was canceled as he has no angina. Limited TTE (7/1/2022)   Normal left ventricle size. Estimated left ventricle ejection fraction 61 %. Normal right ventricular size and function. Compared to study dated 10/10/2020: LV regional and global systolic  function improved. EKG  Sinus Rhythm  First degree A-V block       ASSESSMENT:  1. Chronic HFmrEF with improved LV function (LVEF 44% > 61%)  2. ACC stage C / NYHA class II  3. Euvolemic  4. CAD with  mid LAD otherwise mild diffuse disease. 5. HTN  6. CKD  7. T2DM  8. Hypothyroidism  9. Morbid obesity  10. Possible MARLEEN      PLAN:  1. Continue current cardiac medications     2. Continue to follow up with CHF clinic    3. Referral for sleep study    4. Follow up with Dr. Mary Ortiz as scheduled     5.  Weigh yourself daily    -Stay Hydrated    -Diet should sodium restricted to 2 grams    -Again watch your daily weight trends and if you gain water weight please follow below instructions.    -If you gain 3-5 pounds in 2-3 days OR notice that you are retaining fluid in anyway just like you did before then take an extra dose of your water pill (furosemide/Lasix) every day until you lose the weight or feel better.     -If you notice that you have taken more than 2 extra doses in 1 week then please call and let us know.    -If at any time you feel that you are retaining fluid, your medications are not working, or you feel ill in anyway, then please call us for either same day appointment or the next day, and for instructions. Our goal is to keep you out of the emergency room and the hospital and we have ways to do it. You just need to call us in a timely manner.     -If you become sick for other reasons, and notice that you are not urinating as much, the urine is very dark, you have significant diarrhea or vomiting, then please DO NOT take your water pill and CALL US immediately. > 40 minutes was spent reviewing chart and more than 50 % of that time was spent face to face with patient educating on heart failure, prognosis, treatment, medications and diet.        Su LUGO-CNP  [facility] Cardiology

## 2022-07-26 ENCOUNTER — TELEPHONE (OUTPATIENT)
Dept: CARDIOLOGY CLINIC | Age: 62
End: 2022-07-26

## 2022-07-26 NOTE — TELEPHONE ENCOUNTER
[address]. HE HAD COVID LAST WEEK. ON Sunday HIS BP WAS 86/52 SO THEY HELD HIS AFTERNOON DOSE OF LASIX, ENTRESTO, AND CARVEDILOL. SHE SAID SHE BELIEVES HE MAY HAVE BEEN DEHYDRATED. HIS PRESSURES FOLLOWING Sunday WERE 107/56, 101/67 HR 79, 108/73 HR75, 106/66 HR 76.       PLEASE ADVISE

## 2022-08-11 NOTE — TELEPHONE ENCOUNTER
La Torres, Patient of Dr Luis Wilks,  please let me know whose recall list to place . name on, Dr Luis Wilks will be leaving [facility] and don't want him to be missed in f/u .      Left VM to call office update how feeling:  Bp/hr readings  Any dizzy/lightheaded   Any SOB, edema, dizziness    Pending call back            Future Appointments   Date Time Provider Jacob Busch   9/19/2022  9:00 PM [facility] BEDROOM 1 [facility] [place] HOD   10/10/2022  2:00 PM [facility] CHF ROOM 1 [facility]

## 2022-08-12 NOTE — TELEPHONE ENCOUNTER
Thank you for follow up  Will need to talk to practice manager about secession plan for Dr. Andreina Hawkins

## 2022-08-12 NOTE — TELEPHONE ENCOUNTER
Spoke with wife Nelsy Manual    Bp/hr readings   08273   109/68        wnl hr  Any dizzy/lightheaded. No further episodes. Only held meds 1 dose. Any SOB, edema, dizziness     no. No needs at this time.

## 2022-08-16 ENCOUNTER — HOSPITAL ENCOUNTER (OUTPATIENT)
Age: 62
Discharge: HOME | End: 2022-08-16
Payer: COMMERCIAL

## 2022-08-16 LAB
ALBUMIN SERPL-MCNC: 4.3 G/DL (ref 3.5–5.2)
ALP BLD-CCNC: 112 U/L (ref 40–129)
ALT SERPL-CCNC: 24 U/L (ref 0–40)
ANION GAP SERPL CALCULATED.3IONS-SCNC: 10 MMOL/L (ref 7–16)
AST SERPL-CCNC: 25 U/L (ref 0–39)
BILIRUB SERPL-MCNC: 0.8 MG/DL (ref 0–1.2)
BUN BLDV-MCNC: 33 MG/DL (ref 6–23)
CALCIUM SERPL-MCNC: 9.6 MG/DL (ref 8.6–10.2)
CHLORIDE BLD-SCNC: 101 MMOL/L (ref 98–107)
CHOLESTEROL, FASTING: 129 MG/DL (ref 0–199)
CO2: 26 MMOL/L (ref 22–29)
CREAT SERPL-MCNC: 1.7 MG/DL (ref 0.7–1.2)
GFR AFRICAN AMERICAN: 50
GFR NON-AFRICAN AMERICAN: 41 ML/MIN/1.73
GLUCOSE BLD-MCNC: 153 MG/DL (ref 74–99)
HBA1C MFR BLD: 7.1 % (ref 4–5.6)
HDLC SERPL-MCNC: 34 MG/DL
LDL CHOLESTEROL CALCULATED: 53 MG/DL (ref 0–99)
POTASSIUM SERPL-SCNC: 5.3 MMOL/L (ref 3.5–5)
SODIUM BLD-SCNC: 137 MMOL/L (ref 132–146)
T4 FREE: 1.56 NG/DL (ref 0.93–1.7)
TOTAL PROTEIN: 8.1 G/DL (ref 6.4–8.3)
TRIGLYCERIDE, FASTING: 211 MG/DL (ref 0–149)
TSH SERPL DL<=0.05 MIU/L-ACNC: 2.01 UIU/ML (ref 0.27–4.2)
VLDLC SERPL CALC-MCNC: 42 MG/DL

## 2022-08-16 PROCEDURE — 80053 COMPREHEN METABOLIC PANEL: CPT

## 2022-08-16 PROCEDURE — 80061 LIPID PANEL: CPT

## 2022-08-16 PROCEDURE — 83036 HEMOGLOBIN GLYCOSYLATED A1C: CPT

## 2022-08-16 PROCEDURE — 84443 ASSAY THYROID STIM HORMONE: CPT

## 2022-08-16 PROCEDURE — 84439 ASSAY OF FREE THYROXINE: CPT

## 2022-08-16 PROCEDURE — 36415 COLL VENOUS BLD VENIPUNCTURE: CPT

## 2022-09-16 ENCOUNTER — TELEPHONE (OUTPATIENT)
Dept: CARDIOLOGY CLINIC | Age: 62
End: 2022-09-16

## 2022-09-16 NOTE — TELEPHONE ENCOUNTER
Pt's spouse called to sched next fu appt. Pt was with Cheng Lao and is ok following Dr. Philippe Calle. Unsure when to schedule next appt. Only notes I see were from Laurita's last visit 2/7/2022 for 12 month, but pt was seen from problem on 7/14/2022 with Kourtney Felix. Please advise.  Thank you

## 2022-10-10 ENCOUNTER — HOSPITAL ENCOUNTER (OUTPATIENT)
Dept: OTHER | Age: 62
Setting detail: THERAPIES SERIES
Discharge: HOME | End: 2022-10-10
Payer: COMMERCIAL

## 2022-10-10 VITALS
WEIGHT: 315 LBS | OXYGEN SATURATION: 98 % | DIASTOLIC BLOOD PRESSURE: 78 MMHG | SYSTOLIC BLOOD PRESSURE: 148 MMHG | HEART RATE: 71 BPM | BODY MASS INDEX: 41.8 KG/M2 | RESPIRATION RATE: 18 BRPM

## 2022-10-10 LAB
ANION GAP SERPL CALCULATED.3IONS-SCNC: 11 MMOL/L (ref 7–16)
BUN BLDV-MCNC: 25 MG/DL (ref 6–23)
CALCIUM SERPL-MCNC: 9.8 MG/DL (ref 8.6–10.2)
CHLORIDE BLD-SCNC: 103 MMOL/L (ref 98–107)
CO2: 22 MMOL/L (ref 22–29)
CREAT SERPL-MCNC: 2 MG/DL (ref 0.7–1.2)
GFR AFRICAN AMERICAN: 41
GFR NON-AFRICAN AMERICAN: 34 ML/MIN/1.73
GLUCOSE BLD-MCNC: 120 MG/DL (ref 74–99)
POTASSIUM SERPL-SCNC: 4.5 MMOL/L (ref 3.5–5)
PRO-BNP: 117 PG/ML (ref 0–125)
SODIUM BLD-SCNC: 136 MMOL/L (ref 132–146)

## 2022-10-10 PROCEDURE — 83880 ASSAY OF NATRIURETIC PEPTIDE: CPT

## 2022-10-10 PROCEDURE — 36415 COLL VENOUS BLD VENIPUNCTURE: CPT

## 2022-10-10 PROCEDURE — 99214 OFFICE O/P EST MOD 30 MIN: CPT

## 2022-10-10 PROCEDURE — 80048 BASIC METABOLIC PNL TOTAL CA: CPT

## 2022-10-10 NOTE — PROGRESS NOTES
[address] [facility]   1960            Referring Doctor:   Primary Care Physician: Dr. Maryuri Martinez  Cardiologist: Dr. Aura Burk  Nephrologist:         History of Present Illness:     Cari Medrano is a 63 y.o. male with a history of HFpEF, most recent EF 60-65% on 10/10/20. Patient Story:    He does not  have dyspnea with exertion, shortness of breath, or decline in overall functional capacity. He does not have orthopnea, PND, nocturnal cough or hemoptysis. He does not have abdominal distention or bloating, early satiety, anorexia/change in appetite. He does has a good urinary response to  oral diuretic. He does not have  lower extremity edema. He denies lightheadedness, dizziness. He denies palpitations, syncope or near syncope. He does not complain of chest pain, pressure, discomfort. No Known Allergies      Prior to Visit Medications    Medication Sig Taking? Authorizing Provider   Cholecalciferol (VITAMIN D3) 125 MCG (5000 UT) TABS Take by mouth daily  Historical Provider, MD   levothyroxine (SYNTHROID) 150 MCG tablet TAKE 1 TABLET BY MOUTH EVERY DAY  Historical Provider, MD   nitroGLYCERIN (NITROSTAT) 0.4 MG SL tablet up to max of 3 total doses. If no relief after 1 dose, call 911.   Patient not taking: Reported on 10/10/2022  BRITTNEY Guerrero CNP   atorvastatin (LIPITOR) 40 MG tablet Take 1 tablet by mouth daily  Miriam Will MD   furosemide (LASIX) 20 MG tablet Take 1 tablet by mouth daily  BRITTNEY Guerrero CNP   sacubitril-valsartan (ENTRESTO)  MG per tablet Take 1 tablet by mouth 2 times daily  Miriam Will MD   zinc 50 MG TABS tablet Take 50 mg by mouth daily  Historical Provider, MD   empagliflozin (JARDIANCE) 25 MG tablet Take 25 mg by mouth daily  Historical Provider, MD   carvedilol (COREG) 25 MG tablet Take 25 mg by mouth 2 times daily (with meals)  Historical Provider, MD   aspirin EC 81 MG EC tablet Take 1 tablet by mouth daily BRITTNEY Campos CNP   spironolactone (ALDACTONE) 25 MG tablet Take 1 tablet by mouth daily  BRITTNEY Campos CNP   vitamin E 400 UNIT capsule Take 1,000 Units by mouth daily   Historical Provider, MD   magnesium oxide (MAG-OX) 400 MG tablet TAKE 1 TABLET BY MOUTH DAILY  Khris Ro MD   Multiple Vitamins-Minerals (THERAPEUTIC MULTIVITAMIN-MINERALS) tablet Take 1 tablet by mouth daily  Historical Provider, MD                   Guideline directed medical:  ARNI/ACE I/ARB: Yes  Beta blocker: Yes  Aldosterone antagonist:  Yes        Physical Examination:     BP (!) 148/78   Pulse 71   Resp 18   Wt (!) 330 lb (149.7 kg)   SpO2 98%   BMI 41.80 kg/m²     Assessment  Charting Type: Shift assessment (chf clinic)                   Respiratory  Respiratory Pattern: Regular  Respiratory Depth: Normal  Respiratory Quality/Effort: Unlabored  Chest Assessment: Chest expansion symmetrical, Trachea midline  L Breath Sounds: Clear, Diminished  R Breath Sounds: Clear, Diminished              Cardiac  Cardiac Regularity: Regular  Cardiac Rhythm: Sinus rhythm    Rhythm Interpretation  Heart Rate: 71         Gastrointestinal  Abdominal (WDL): Within Defined Limits               Peripheral Vascular  Peripheral Vascular (WDL): Within Defined Limits  Edema: None  RLE Edema: None  LLE Edema: None                                                 Heart Rate: 71                   LAB DATA:    BNP  No results for input(s): BNP in the last 72 hours.     proBNP  Recent Labs     10/10/22  1410   PROBNP 117       BMP  Recent Labs     10/10/22  1410      K 4.5      CO2 22   BUN 25*   CREATININE 2.0*   GLUCOSE 120*   CALCIUM 9.8         WEIGHTS:  Wt Readings from Last 3 Encounters:   10/10/22 (!) 330 lb (149.7 kg)   07/14/22 (!) 336 lb (152.4 kg)   04/18/22 (!) 343 lb (155.6 kg)         TELEMETRY:  Cardiac Regularity: Regular  Cardiac Rhythm/Interpretation: NSR        ASSESSMENT:  Marco Brown is evolemic with stable weights     Interventions completed this visit:  IV diuretics given no  Lab work obtained yes, BMP, BNP   Reviewed continue current medications that patient as prescribed answered any questions   Educated on signs and symptoms of HF  Educated on low sodium diet    PLAN:  Scheduled to follow up in CHF clinic on Oct 14th  Given clinic phone number and aware of signs and symptoms to call with any HF change in symptoms. After medications were confirmed by pt, pt admits wife does meds,  pt confirmed wife will call back to confirm meds. Will call for earlier appt. If needed.

## 2022-10-10 NOTE — RESULT ENCOUNTER NOTE
Labs and CHF clinic note reviewed  Received call from [address] in CHF clinic given rise in creatinine    Patient is euvolemic    Instructed to stop daily Lasix and make as needed  Stay hydrated  Follow up labs in 7-10 days     Thank you

## 2022-10-25 ENCOUNTER — TELEPHONE (OUTPATIENT)
Dept: CARDIOLOGY CLINIC | Age: 62
End: 2022-10-25

## 2022-10-25 ENCOUNTER — HOSPITAL ENCOUNTER (OUTPATIENT)
Age: 62
Discharge: HOME | End: 2022-10-25

## 2022-10-25 DIAGNOSIS — I50.32 CHRONIC HEART FAILURE WITH PRESERVED EJECTION FRACTION (HFPEF) (HCC): Primary | ICD-10-CM

## 2022-10-25 DIAGNOSIS — I50.22 CHRONIC SYSTOLIC HEART FAILURE (HCC): ICD-10-CM

## 2022-10-25 LAB
REASON FOR REJECTION: NORMAL
REJECTED TEST: NORMAL

## 2022-10-25 NOTE — TELEPHONE ENCOUNTER
----- Message from BRITTNEY Sosa CNP sent at 10/25/2022  4:52 PM EDT -----  Please let patient know that labs were rejected and will need redrawn

## 2022-10-26 ENCOUNTER — HOSPITAL ENCOUNTER (OUTPATIENT)
Age: 62
Discharge: HOME | End: 2022-10-26
Payer: COMMERCIAL

## 2022-10-26 DIAGNOSIS — I50.32 CHRONIC HEART FAILURE WITH PRESERVED EJECTION FRACTION (HFPEF) (HCC): ICD-10-CM

## 2022-10-26 LAB
ANION GAP SERPL CALCULATED.3IONS-SCNC: 10 MMOL/L (ref 7–16)
BUN BLDV-MCNC: 29 MG/DL (ref 6–23)
CALCIUM SERPL-MCNC: 10.1 MG/DL (ref 8.6–10.2)
CHLORIDE BLD-SCNC: 102 MMOL/L (ref 98–107)
CO2: 24 MMOL/L (ref 22–29)
CREAT SERPL-MCNC: 1.5 MG/DL (ref 0.7–1.2)
GFR SERPL CREATININE-BSD FRML MDRD: 53 ML/MIN/1.73
GLUCOSE BLD-MCNC: 136 MG/DL (ref 74–99)
POTASSIUM SERPL-SCNC: 4.4 MMOL/L (ref 3.5–5)
PRO-BNP: 79 PG/ML (ref 0–125)
SODIUM BLD-SCNC: 136 MMOL/L (ref 132–146)

## 2022-10-26 PROCEDURE — 83880 ASSAY OF NATRIURETIC PEPTIDE: CPT

## 2022-10-26 PROCEDURE — 80048 BASIC METABOLIC PNL TOTAL CA: CPT

## 2022-10-26 PROCEDURE — 36415 COLL VENOUS BLD VENIPUNCTURE: CPT

## 2023-07-07 ENCOUNTER — HOSPITAL ENCOUNTER (OUTPATIENT)
Dept: OTHER | Age: 63
Setting detail: THERAPIES SERIES
Discharge: HOME | End: 2023-07-07
Payer: COMMERCIAL

## 2023-07-07 VITALS
SYSTOLIC BLOOD PRESSURE: 121 MMHG | DIASTOLIC BLOOD PRESSURE: 74 MMHG | OXYGEN SATURATION: 96 % | BODY MASS INDEX: 38.64 KG/M2 | RESPIRATION RATE: 18 BRPM | HEART RATE: 82 BPM | WEIGHT: 305 LBS

## 2023-07-07 LAB
ANION GAP SERPL CALCULATED.3IONS-SCNC: 10 MMOL/L (ref 7–16)
BNP BLD-MCNC: 66 PG/ML (ref 0–125)
BUN SERPL-MCNC: 21 MG/DL (ref 6–23)
CALCIUM SERPL-MCNC: 9.6 MG/DL (ref 8.6–10.2)
CHLORIDE SERPL-SCNC: 104 MMOL/L (ref 98–107)
CO2 SERPL-SCNC: 22 MMOL/L (ref 22–29)
CREAT SERPL-MCNC: 1.5 MG/DL (ref 0.7–1.2)
GLUCOSE SERPL-MCNC: 130 MG/DL (ref 74–99)
POTASSIUM SERPL-SCNC: 4.3 MMOL/L (ref 3.5–5)
SODIUM SERPL-SCNC: 136 MMOL/L (ref 132–146)

## 2023-07-07 PROCEDURE — 36415 COLL VENOUS BLD VENIPUNCTURE: CPT

## 2023-07-07 PROCEDURE — 80048 BASIC METABOLIC PNL TOTAL CA: CPT

## 2023-07-07 PROCEDURE — 83880 ASSAY OF NATRIURETIC PEPTIDE: CPT

## 2023-07-07 PROCEDURE — 99214 OFFICE O/P EST MOD 30 MIN: CPT

## 2023-07-07 ASSESSMENT — PATIENT HEALTH QUESTIONNAIRE - PHQ9
SUM OF ALL RESPONSES TO PHQ9 QUESTIONS 1 & 2: 0
2. FEELING DOWN, DEPRESSED OR HOPELESS: NOT AT ALL
1. LITTLE INTEREST OR PLEASURE IN DOING THINGS: NOT AT ALL

## 2023-07-07 NOTE — PLAN OF CARE
Problem: Chronic Conditions and Co-morbidities  Goal: Patient's chronic conditions and co-morbidity symptoms are monitored and maintained or improved  Flowsheets (Taken 7/7/2023 [id number])  Care Plan - Patient's Chronic Conditions and Co-Morbidity Symptoms are Monitored and Maintained or Improved: Monitor and assess patient's chronic conditions and comorbid symptoms for stability, deterioration, or improvement

## 2023-07-07 NOTE — PROGRESS NOTES
Cristine   1960      Referring Doctor:   Primary Care Physician: Dr. Fermín Pfeiffer  Cardiologist: Dr. Vincenzo Sanders  Nephrologist:         History of Present Illness:     Ángel Hope is a 66 y.o. male with a history of HFpEF, most recent EF 61% on 2/7/22. Patient Story:    He does not have dyspnea with exertion, shortness of breath, or decline in overall functional capacity. He does not have orthopnea, PND, nocturnal cough or hemoptysis. He does not have abdominal distention or bloating, early satiety, anorexia/change in appetite. He does has a good urinary response to  oral diuretic. He does not have lower extremity edema. He denies lightheadedness, dizziness. He denies palpitations, syncope or near syncope. He does not complain of chest pain, pressure, discomfort. No Known Allergies      Prior to Visit Medications    Medication Sig Taking? Authorizing Provider   Cholecalciferol (VITAMIN D3) 125 MCG (5000 UT) TABS Take by mouth daily  Historical Provider, MD   levothyroxine (SYNTHROID) 150 MCG tablet TAKE 1 TABLET BY MOUTH EVERY DAY  Historical Provider, MD   nitroGLYCERIN (NITROSTAT) 0.4 MG SL tablet up to max of 3 total doses. If no relief after 1 dose, call 911.   Patient not taking: Reported on 10/10/2022  BRITTNEY Lee - CNP   atorvastatin (LIPITOR) 40 MG tablet Take 1 tablet by mouth daily  Margaret Kvoacs MD   furosemide (LASIX) 20 MG tablet Take 1 tablet by mouth daily  Patient taking differently: Take 1 tablet by mouth daily Take as needed for weight gain, increase shortness of breath and edema  BRITTNEY Lee - VILLA   sacubitril-valsartan (ENTRESTO)  MG per tablet Take 1 tablet by mouth 2 times daily  Margaret Kovacs MD   zinc 50 MG TABS tablet Take 1 tablet by mouth daily  Historical Provider, MD   empagliflozin (JARDIANCE) 25 MG tablet Take 1 tablet by mouth daily  Historical Provider, MD   carvedilol (COREG) 25 MG tablet Take 1

## 2023-07-21 ENCOUNTER — APPOINTMENT (OUTPATIENT)
Dept: OTHER | Age: 63
End: 2023-07-21
Payer: COMMERCIAL

## 2024-01-05 ENCOUNTER — HOSPITAL ENCOUNTER (OUTPATIENT)
Dept: OTHER | Age: 64
Setting detail: THERAPIES SERIES
Discharge: HOME | End: 2024-01-05

## 2024-04-05 ENCOUNTER — HOSPITAL ENCOUNTER (OUTPATIENT)
Dept: OTHER | Age: 64
Setting detail: THERAPIES SERIES
Discharge: HOME | End: 2024-04-05
Payer: COMMERCIAL

## 2024-04-05 VITALS
BODY MASS INDEX: 34.46 KG/M2 | OXYGEN SATURATION: 96 % | DIASTOLIC BLOOD PRESSURE: 73 MMHG | RESPIRATION RATE: 18 BRPM | WEIGHT: 272 LBS | SYSTOLIC BLOOD PRESSURE: 118 MMHG

## 2024-04-05 DIAGNOSIS — I50.32 CHRONIC HEART FAILURE WITH PRESERVED EJECTION FRACTION (HFPEF) (HCC): Primary | ICD-10-CM

## 2024-04-05 LAB
ANION GAP SERPL CALCULATED.3IONS-SCNC: 12 MMOL/L (ref 7–16)
BNP SERPL-MCNC: 724 PG/ML (ref 0–125)
BUN SERPL-MCNC: 28 MG/DL (ref 6–23)
CALCIUM SERPL-MCNC: 9.3 MG/DL (ref 8.6–10.2)
CHLORIDE SERPL-SCNC: 105 MMOL/L (ref 98–107)
CO2 SERPL-SCNC: 20 MMOL/L (ref 22–29)
CREAT SERPL-MCNC: 1.4 MG/DL (ref 0.7–1.2)
EKG ATRIAL RATE: 125 BPM
EKG Q-T INTERVAL: 326 MS
EKG QRS DURATION: 66 MS
EKG QTC CALCULATION (BAZETT): 427 MS
EKG R AXIS: 59 DEGREES
EKG T AXIS: 82 DEGREES
EKG VENTRICULAR RATE: 103 BPM
GFR SERPL CREATININE-BSD FRML MDRD: 59 ML/MIN/1.73M2
GLUCOSE SERPL-MCNC: 112 MG/DL (ref 74–99)
POTASSIUM SERPL-SCNC: 4 MMOL/L (ref 3.5–5)
SODIUM SERPL-SCNC: 137 MMOL/L (ref 132–146)

## 2024-04-05 PROCEDURE — 99215 OFFICE O/P EST HI 40 MIN: CPT

## 2024-04-05 PROCEDURE — 80048 BASIC METABOLIC PNL TOTAL CA: CPT

## 2024-04-05 PROCEDURE — 83880 ASSAY OF NATRIURETIC PEPTIDE: CPT

## 2024-04-05 RX ORDER — TIRZEPATIDE 5 MG/.5ML
5 INJECTION, SOLUTION SUBCUTANEOUS WEEKLY
COMMUNITY

## 2024-04-05 NOTE — PROGRESS NOTES
PHYSICAL EXAMINATION:    Vitals:    04/05/24 0647   BP: 118/73   Resp: 18   SpO2: 96%                                                                                              LAB DATA:  BMP:  Sodium (mmol/L)   Date Value   04/05/2024 137   07/07/2023 136   10/26/2022 136     Potassium (mmol/L)   Date Value   04/05/2024 4.0   07/07/2023 4.3   10/26/2022 4.4     Potassium reflex Magnesium (mmol/L)   Date Value   10/07/2020 4.2     Chloride (mmol/L)   Date Value   04/05/2024 105   07/07/2023 104   10/26/2022 102     CO2 (mmol/L)   Date Value   04/05/2024 20 (L)   07/07/2023 22   10/26/2022 24     BUN (mg/dL)   Date Value   04/05/2024 28 (H)   07/07/2023 21   10/26/2022 29 (H)     Creatinine (mg/dL)   Date Value   04/05/2024 1.4 (H)   07/07/2023 1.5 (H)   10/26/2022 1.5 (H)     Glucose (mg/dL)   Date Value   04/05/2024 112 (H)   07/07/2023 130 (H)   10/26/2022 136 (H)     Calcium (mg/dL)   Date Value   04/05/2024 9.3   07/07/2023 9.6   10/26/2022 10.1     BNP:  Pro-BNP (pg/mL)   Date Value   04/05/2024 724 (H)   07/07/2023 66   10/26/2022 79      CBC:  WBC (E9/L)   Date Value   10/12/2020 9.9     Hemoglobin (g/dL)   Date Value   10/12/2020 11.5 (L)     Hematocrit (%)   Date Value   10/12/2020 40.3     Platelets (E9/L)   Date Value   10/12/2020 232     Iron Studies:  No results found for: \"FERRITIN\", \"IRON\", \"TIBC\"  Hepatic:  AST (U/L)   Date Value   08/16/2022 25     ALT (U/L)   Date Value   08/16/2022 24     Total Bilirubin (mg/dL)   Date Value   08/16/2022 0.8     Alkaline Phosphatase (U/L)   Date Value   08/16/2022 112     INR:  INR (no units)   Date Value   10/12/2020 1.1         Wt Readings from Last 3 Encounters:   04/05/24 123.4 kg (272 lb)   07/07/23 (!) 138.3 kg (305 lb)   10/10/22 (!) 149.7 kg (330 lb)           ASSESSMENT/PLAN:    [x] Euvolemic, NO JVD, DENIES ANY DISCOMFORT.          [] Hypervolemic, with increase from baseline:  [] Shortness of breath/LÓPEZ  [] JVD  [] HJR  [] Abnormal lung assessment:

## 2024-04-09 ENCOUNTER — TELEPHONE (OUTPATIENT)
Dept: ADMINISTRATIVE | Age: 64
End: 2024-04-09

## 2024-04-09 NOTE — TELEPHONE ENCOUNTER
Pt's wife calling for an apt with \"Dr. Catalan\" dx: Afib per the CHF clinic.  No available apts with him until end of Aug in [facility] and end of April in [place].  Please return pts call with further advise/apt.  Thank you.  They left  on  line also yesterday.

## 2024-04-18 ENCOUNTER — OFFICE VISIT (OUTPATIENT)
Dept: CARDIOLOGY CLINIC | Age: 64
End: 2024-04-18
Payer: COMMERCIAL

## 2024-04-18 VITALS
DIASTOLIC BLOOD PRESSURE: 62 MMHG | HEIGHT: 75 IN | BODY MASS INDEX: 33.35 KG/M2 | WEIGHT: 268.2 LBS | SYSTOLIC BLOOD PRESSURE: 110 MMHG | HEART RATE: 101 BPM | RESPIRATION RATE: 18 BRPM

## 2024-04-18 DIAGNOSIS — E78.2 MIXED HYPERLIPIDEMIA: ICD-10-CM

## 2024-04-18 DIAGNOSIS — I48.19 PERSISTENT ATRIAL FIBRILLATION (HCC): ICD-10-CM

## 2024-04-18 DIAGNOSIS — I10 ESSENTIAL HYPERTENSION: ICD-10-CM

## 2024-04-18 DIAGNOSIS — I50.9 CONGESTIVE HEART FAILURE, UNSPECIFIED HF CHRONICITY, UNSPECIFIED HEART FAILURE TYPE (HCC): Primary | ICD-10-CM

## 2024-04-18 PROCEDURE — 3078F DIAST BP <80 MM HG: CPT | Performed by: INTERNAL MEDICINE

## 2024-04-18 PROCEDURE — 3074F SYST BP LT 130 MM HG: CPT | Performed by: INTERNAL MEDICINE

## 2024-04-18 PROCEDURE — 93000 ELECTROCARDIOGRAM COMPLETE: CPT | Performed by: INTERNAL MEDICINE

## 2024-04-18 PROCEDURE — 99214 OFFICE O/P EST MOD 30 MIN: CPT | Performed by: INTERNAL MEDICINE

## 2024-04-18 RX ORDER — ALLOPURINOL 100 MG/1
100 TABLET ORAL DAILY
COMMUNITY

## 2024-04-18 NOTE — H&P (VIEW-ONLY)
consistent with J- score of 1 due to the blunt stump. Moderate-sized ramus intermedius with mild diffuse disease. Left circumflex: Gives off a dominant OM.  Mild diffuse disease. RCA: Large dominant vessel with large RPDA and large RPL.  The RPDA gives prominent septal collaterals to the LAD.  The RPL appears to give faint epicardial collaterals to a diagonal. Hemodynamics: LVEDP 26 Ao: 144/87 with a mean of 112. Conclusions: Mid LAD  with prominent right to left septal collaterals from the RPDA. J- score 1 for blunt stump Otherwise mild diffuse CAD. Elevated left filling pressure. PLAN (per Dr. Shay): The LAD distribution appears viable on resting TTE.  Given his young age I recommend LAD revascularization.   PCI of the LAD appears feasible with features generally consistent with high success rate.  I am happy to see him in consult regarding that.      Past Medical History:   Diagnosis Date    Arthritis     knees    CHF (congestive heart failure) (HCC)     CKD (chronic kidney disease)     DM (diabetes mellitus) (HCC)     Hyperlipidemia     Hypertension     Hypothyroidism     Thyroid nodule 11/07/2020       Past Surgical History:   Procedure Laterality Date    CARDIAC CATHETERIZATION  10/12/2020         No Known Allergies      Outpatient Medications Marked as Taking for the 4/18/24 encounter (Office Visit) with Freddie Catalan MD   Medication Sig Dispense Refill    allopurinol (ZYLOPRIM) 100 MG tablet Take 1 tablet by mouth daily      Tirzepatide (MOUNJARO) 5 MG/0.5ML SOPN SC injection Inject 12.5 mg into the skin once a week      apixaban (ELIQUIS) 5 MG TABS tablet Take by mouth 2 times daily WILL START 4/5/24      Cholecalciferol (VITAMIN D3) 125 MCG (5000 UT) TABS Take by mouth daily      levothyroxine (SYNTHROID) 150 MCG tablet TAKE 1 TABLET BY MOUTH EVERY DAY      nitroGLYCERIN (NITROSTAT) 0.4 MG SL tablet up to max of 3 total doses. If no relief after 1 dose, call 911. 25 tablet 3    atorvastatin  habitus. Definity echo   contrast was used to delineate endocardial borders.    [facility] (10/12/2020, Dr. Shay)  Coronary anatomy:  Dominance: Right  Left main: Intragraphically normal  LAD: Gives off a large first diagonal and is followed by a chronic total occlusion in the midsegment.  The occlusion appears to be short, nontortuous, not heavily calcified, with a very good quality distal vessel based on collateral circulation from the RCA.  This is consistent with J- score of 1 due to the blunt stump.  Moderate-sized ramus intermedius with mild diffuse disease  Left circumflex: Gives off a dominant OM.  Mild diffuse disease  RCA: Large dominant vessel with large RPDA and large RPL.  The RPDA gives prominent septal collaterals to the LAD.  The RPL appears to give faint epicardial collaterals to a diagonal.  Hemodynamics:  LVEDP 26  Ao: 144/87 with a mean of 112  Conclusions:  Mid LAD  with prominent right to left septal collaterals from the RPDA. J- score 1 for blunt stump  Otherwise mild diffuse CAD  Elevated left filling pressure  PLAN (per Dr. Shay):  The LAD distribution appears viable on resting TTE.  Given his young age I recommend LAD revascularization.   PCI of the LAD appears feasible with features generally consistent with high success rate.  I am happy to see him in consult regarding that.    Cardiac MRI (11/2020, [facility])  IMPRESSION:   1. The left ventricle is dilated (EDVi = 114 cc/m2 ), and has mildly   reduced function (LVEF = 44 %).  There is regional wall motion   abnormality with severe hypokinesia of the mid to apical anterior and   apical septal walls including the apex.   -During stress perfusion, there is a hypointense rim along the mid to   apical anterior and anteroseptal segments, which is also seen during rest   imaging, consistent with ischemic damage. Delayed-enhancement imaging   also reveals ischemic scar in a LAD territory distribution as discussed   in the body of report.

## 2024-04-18 NOTE — PATIENT INSTRUCTIONS
Continue all your medications at current doses.   We will schedule a cardioversion. 5/17 at [place].   Restrict sodium intake to less than 2-2.5 g/day. Restrict fluid intake to less than 2.2 L/day. Goal BP is less than 130/80.   If your weight increases by > 2 lbs in a day or >5 lbs in more than a day, take an extra lasix pill.   Please try to exercise for 150 minutes a week.   I will see you back in the office in 6 months. Please call the office at ([phone], option [phone]) if you have any questions.

## 2024-04-18 NOTE — PROGRESS NOTES
hypertension 02/18/2016     Past Medical/surgical history:    HFpEF  Echocardiogram 2/19/2016 ([facility]):  Normal left ventricle size and systolic function EF 60%,   Moderate concentric left ventricular hypertrophy   Moderately dilated left atrium  Moderately dilated right  Ventricle  Borderline reduced right ventricle systolic function  Markedly  enlarged right atrium size.  Mild mitral regurgitation  Moderate tricuspid regurgitation.   RVSP is 55 mmHg.  Stage III diastolic dysfunction  Lexiscan nuclear medicine stress test 2/28/2016: Small fixed perfusion defect over the apical left ventricular myocardium with hypokinetic wall motion suggestive of scarring, no reversible defect noted.  EF calculated at 55%  Hypertension  Hypothyroidism  Chronic kidney disease  Non-insulin-requiring type 2 diabetes mellitus, A1c 7.7 5/22/2020  Lifelong non-smoker  TTE (10/10/2020, Dr. Heller) Summary:  Normal left ventricle size and systolic function.  Ejection fraction is visually estimated at 60-65%.  Distal anteroseputum and apex are hypokinetic.  Mild concentric left ventricular hypertrophy.  Stage II diastolic dysfunction.  The left atrium is severely dilated.  Normal right ventricular size and function. TAPSE 26 mm.  Physiologic and/or trace mitral regurgitation is present. No hemodynamically significant aortic stenosis is present.  Unable to estimate PA systolic pressure.  No evidence for hemodynamically significant pericardial effusion. Technically difficult examination due to body habitus. Definity echo  contrast was used to delineate endocardial borders.  [facility] (10/12/2020, Dr. Shay) Coronary anatomy: Dominance: Right. Left main: Intragraphically normal. LAD: Gives off a large first diagonal and is followed by a chronic total occlusion in the midsegment.  The occlusion appears to be short, nontortuous, not heavily calcified, with a very good quality distal vessel based on collateral circulation from the RCA.  This is

## 2024-04-19 ENCOUNTER — TELEPHONE (OUTPATIENT)
Dept: CARDIOLOGY CLINIC | Age: 64
End: 2024-04-19

## 2024-04-19 NOTE — TELEPHONE ENCOUNTER
Patient scheduled for Cardioversion per Dr. Catalan 5-17-24 arrival 7:30 am Tahmina Horvath       Instructions given to wife     No prior authorization required

## 2024-04-25 NOTE — PROGRESS NOTES
Wife called and cancelled [facility]F Clinic appt.  Pt. Saw cardiologist yesterday and he would like pt seen after cardioversion.  Pt will call back to re schedule.

## 2024-04-26 ENCOUNTER — HOSPITAL ENCOUNTER (OUTPATIENT)
Dept: OTHER | Age: 64
Setting detail: THERAPIES SERIES
Discharge: HOME | End: 2024-04-26
Payer: COMMERCIAL

## 2024-05-16 ENCOUNTER — TELEPHONE (OUTPATIENT)
Age: 64
End: 2024-05-16

## 2024-05-17 ENCOUNTER — ANESTHESIA EVENT (OUTPATIENT)
Age: 64
End: 2024-05-17
Payer: COMMERCIAL

## 2024-05-17 ENCOUNTER — HOSPITAL ENCOUNTER (OUTPATIENT)
Age: 64
Discharge: HOME | End: 2024-05-17
Attending: INTERNAL MEDICINE
Payer: COMMERCIAL

## 2024-05-17 ENCOUNTER — ANESTHESIA (OUTPATIENT)
Age: 64
End: 2024-05-17
Payer: COMMERCIAL

## 2024-05-17 VITALS
TEMPERATURE: 97.9 F | HEIGHT: 75 IN | DIASTOLIC BLOOD PRESSURE: 77 MMHG | OXYGEN SATURATION: 92 % | HEART RATE: 68 BPM | BODY MASS INDEX: 32.33 KG/M2 | SYSTOLIC BLOOD PRESSURE: 109 MMHG | WEIGHT: 260 LBS | RESPIRATION RATE: 20 BRPM

## 2024-05-17 DIAGNOSIS — I48.19 PERSISTENT ATRIAL FIBRILLATION (HCC): ICD-10-CM

## 2024-05-17 LAB
ANION GAP SERPL CALCULATED.3IONS-SCNC: 12 MMOL/L (ref 7–16)
BASOPHILS # BLD: 0.07 K/UL (ref 0–0.2)
BASOPHILS NFR BLD: 1 % (ref 0–2)
BUN SERPL-MCNC: 30 MG/DL (ref 6–23)
CALCIUM SERPL-MCNC: 9 MG/DL (ref 8.6–10.2)
CHLORIDE SERPL-SCNC: 106 MMOL/L (ref 98–107)
CO2 SERPL-SCNC: 21 MMOL/L (ref 22–29)
CREAT SERPL-MCNC: 1.6 MG/DL (ref 0.7–1.2)
ECHO BSA: 2.5 M2
EKG ATRIAL RATE: 87 BPM
EKG P AXIS: 38 DEGREES
EKG P-R INTERVAL: 246 MS
EKG Q-T INTERVAL: 354 MS
EKG QRS DURATION: 74 MS
EKG QTC CALCULATION (BAZETT): 425 MS
EKG R AXIS: 40 DEGREES
EKG T AXIS: 67 DEGREES
EKG VENTRICULAR RATE: 87 BPM
EOSINOPHIL # BLD: 0.18 K/UL (ref 0.05–0.5)
EOSINOPHILS RELATIVE PERCENT: 2 % (ref 0–6)
ERYTHROCYTE [DISTWIDTH] IN BLOOD BY AUTOMATED COUNT: 13.9 % (ref 11.5–15)
GFR, ESTIMATED: 48 ML/MIN/1.73M2
GLUCOSE SERPL-MCNC: 98 MG/DL (ref 74–99)
HCT VFR BLD AUTO: 52.7 % (ref 37–54)
HGB BLD-MCNC: 17.4 G/DL (ref 12.5–16.5)
IMM GRANULOCYTES # BLD AUTO: 0.03 K/UL (ref 0–0.58)
IMM GRANULOCYTES NFR BLD: 0 % (ref 0–5)
LYMPHOCYTES NFR BLD: 1.32 K/UL (ref 1.5–4)
LYMPHOCYTES RELATIVE PERCENT: 17 % (ref 20–42)
MCH RBC QN AUTO: 30.5 PG (ref 26–35)
MCHC RBC AUTO-ENTMCNC: 33 G/DL (ref 32–34.5)
MCV RBC AUTO: 92.3 FL (ref 80–99.9)
MONOCYTES NFR BLD: 0.6 K/UL (ref 0.1–0.95)
MONOCYTES NFR BLD: 8 % (ref 2–12)
NEUTROPHILS NFR BLD: 71 % (ref 43–80)
NEUTS SEG NFR BLD: 5.43 K/UL (ref 1.8–7.3)
PLATELET # BLD AUTO: 210 K/UL (ref 130–450)
PMV BLD AUTO: 10.4 FL (ref 7–12)
POTASSIUM SERPL-SCNC: 4.5 MMOL/L (ref 3.5–5)
RBC # BLD AUTO: 5.71 M/UL (ref 3.8–5.8)
SODIUM SERPL-SCNC: 139 MMOL/L (ref 132–146)
WBC OTHER # BLD: 7.6 K/UL (ref 4.5–11.5)

## 2024-05-17 PROCEDURE — 6360000002 HC RX W HCPCS: Performed by: NURSE ANESTHETIST, CERTIFIED REGISTERED

## 2024-05-17 PROCEDURE — 7100000010 HC PHASE II RECOVERY - FIRST 15 MIN: Performed by: INTERNAL MEDICINE

## 2024-05-17 PROCEDURE — 85025 COMPLETE CBC W/AUTO DIFF WBC: CPT

## 2024-05-17 PROCEDURE — 93010 ELECTROCARDIOGRAM REPORT: CPT | Performed by: INTERNAL MEDICINE

## 2024-05-17 PROCEDURE — 2580000003 HC RX 258: Performed by: NURSE ANESTHETIST, CERTIFIED REGISTERED

## 2024-05-17 PROCEDURE — 92960 CARDIOVERSION ELECTRIC EXT: CPT | Performed by: INTERNAL MEDICINE

## 2024-05-17 PROCEDURE — 93005 ELECTROCARDIOGRAM TRACING: CPT | Performed by: INTERNAL MEDICINE

## 2024-05-17 PROCEDURE — 92960 CARDIOVERSION ELECTRIC EXT: CPT

## 2024-05-17 PROCEDURE — 80048 BASIC METABOLIC PNL TOTAL CA: CPT

## 2024-05-17 PROCEDURE — 3700000000 HC ANESTHESIA ATTENDED CARE: Performed by: INTERNAL MEDICINE

## 2024-05-17 RX ORDER — PROPOFOL 10 MG/ML
INJECTION, EMULSION INTRAVENOUS PRN
Status: DISCONTINUED | OUTPATIENT
Start: 2024-05-17 | End: 2024-05-17 | Stop reason: SDUPTHER

## 2024-05-17 RX ORDER — SODIUM CHLORIDE 9 MG/ML
INJECTION, SOLUTION INTRAVENOUS CONTINUOUS PRN
Status: DISCONTINUED | OUTPATIENT
Start: 2024-05-17 | End: 2024-05-17 | Stop reason: SDUPTHER

## 2024-05-17 RX ADMIN — PROPOFOL 70 MG: 10 INJECTION, EMULSION INTRAVENOUS at 08:44

## 2024-05-17 RX ADMIN — SODIUM CHLORIDE: 9 INJECTION, SOLUTION INTRAVENOUS at 08:43

## 2024-05-17 NOTE — ANESTHESIA POSTPROCEDURE EVALUATION
Department of Anesthesiology  Postprocedure Note    Patient: Tristan Peres  MRN: [id number]  YOB: 1960  Date of evaluation: 5/17/2024    Procedure Summary       Date: 05/17/24 Room / Location: [facility] Cardiac Cath Lab    Anesthesia Start: 0843 Anesthesia Stop: 0850    Procedure: CARDIOVERSION EXTERNAL Diagnosis:       Persistent atrial fibrillation (HCC)      (AF)    Scheduled Providers: Kimberley Erazo MD Responsible Provider: Kimberley Erazo MD    Anesthesia Type: MAC ASA Status: 4            Anesthesia Type: No value filed.    Alena Phase I:      Alena Phase II:      Anesthesia Post Evaluation    Patient location during evaluation: PACU  Patient participation: complete - patient participated  Level of consciousness: awake and alert  Airway patency: patent  Nausea & Vomiting: no nausea and no vomiting  Cardiovascular status: blood pressure returned to baseline and hemodynamically stable  Respiratory status: acceptable and spontaneous ventilation  Hydration status: euvolemic  Multimodal analgesia pain management approach  Pain management: adequate    No notable events documented.

## 2024-05-17 NOTE — ANESTHESIA PRE PROCEDURE
Department of Anesthesiology  Preprocedure Note       Name:  Tristan Peres   Age:  64 y.o.  :  1960                                          MRN:  [id number]         Date:  2024      Surgeon: * No surgeons listed *    Procedure: * No procedures listed *    Vital Signs (Current)   Vitals:    24   BP: 122/89   Pulse: 85   Resp: 17   Temp: 36.6 °C (97.9 °F)   SpO2: 96%     Vital Signs Statistics (for past 48 hrs)     Temp  Av.6 °C (97.9 °F)  Min: 36.6 °C (97.9 °F)   Min taken time: 24  Max: 36.6 °C (97.9 °F)   Max taken time: 24  Pulse  Av  Min: 85   Min taken time: 24  Max: 85   Max taken time: 24  Resp  Av  Min: 17   Min taken time: 24  Max: 17   Max taken time: 24  BP  Min: 122/89   Min taken time: 24  Max: 122/89   Max taken time: 24  SpO2  Av %  Min: 96 %   Min taken time: 24  Max: 96 %   Max taken time: 24    BP Readings from Last 3 Encounters:   24 122/89   24 110/62   24 118/73     BMI  Body mass index is 32.5 kg/m².  Estimated body mass index is 32.5 kg/m² as calculated from the following:    Height as of this encounter: 1.905 m (6' 3\").    Weight as of this encounter: 117.9 kg (260 lb).    CBC   Lab Results   Component Value Date/Time    WBC 9.9 10/12/2020 06:23 AM    RBC 4.45 10/12/2020 06:23 AM    HGB 11.5 10/12/2020 06:23 AM    HCT 40.3 10/12/2020 06:23 AM    MCV 90.6 10/12/2020 06:23 AM    RDW 16.0 10/12/2020 06:23 AM     10/12/2020 06:23 AM     CMP    Lab Results   Component Value Date/Time     2024 07:00 AM    K 4.0 2024 07:00 AM    K 4.2 10/07/2020 04:28 PM     2024 07:00 AM    CO2 20 2024 07:00 AM    BUN 28 2024 07:00 AM    CREATININE 1.4 2024 07:00 AM    GFRAA 41 10/10/2022 02:10 PM    LABGLOM 59 2024 07:00 AM    GLUCOSE 112 2024 07:00 AM    CALCIUM 9.3 2024

## 2024-05-17 NOTE — INTERVAL H&P NOTE
Update History & Physical    The patient's History and Physical of April 18, 2024 was reviewed with the patient and I examined the patient. There was no change. The surgical site was confirmed by the patient and me.     Plan: The risks, benefits, expected outcome, and alternative to the recommended procedure have been discussed with the patient. Patient understands and wants to proceed with the procedure.     Electronically signed by Freddie Catalan MD on 5/17/2024 at 9:13 AM

## 2024-11-26 DIAGNOSIS — I50.32 CHRONIC DIASTOLIC (CONGESTIVE) HEART FAILURE (HCC): ICD-10-CM

## 2024-11-26 RX ORDER — APIXABAN 5 MG/1
5 TABLET, FILM COATED ORAL 2 TIMES DAILY
Qty: 180 TABLET | Refills: 3 | Status: SHIPPED | OUTPATIENT
Start: 2024-11-26

## 2025-03-28 ENCOUNTER — HOSPITAL ENCOUNTER (OUTPATIENT)
Age: 65
Discharge: HOME | End: 2025-03-28
Payer: COMMERCIAL

## 2025-03-28 LAB
ALBUMIN SERPL-MCNC: 4.3 G/DL (ref 3.5–5.2)
ALP SERPL-CCNC: 109 U/L (ref 40–129)
ALT SERPL-CCNC: 32 U/L (ref 0–40)
ANION GAP SERPL CALCULATED.3IONS-SCNC: 9 MMOL/L (ref 7–16)
AST SERPL-CCNC: 23 U/L (ref 0–39)
BASOPHILS # BLD: 0.06 K/UL (ref 0–0.2)
BASOPHILS NFR BLD: 1 % (ref 0–2)
BILIRUB SERPL-MCNC: 0.9 MG/DL (ref 0–1.2)
BNP SERPL-MCNC: 63 PG/ML (ref 0–125)
BUN SERPL-MCNC: 21 MG/DL (ref 6–23)
CALCIUM SERPL-MCNC: 9.6 MG/DL (ref 8.6–10.2)
CHLORIDE SERPL-SCNC: 103 MMOL/L (ref 98–107)
CHOLEST SERPL-MCNC: 130 MG/DL
CO2 SERPL-SCNC: 25 MMOL/L (ref 22–29)
CREAT SERPL-MCNC: 1.5 MG/DL (ref 0.7–1.2)
EOSINOPHIL # BLD: 0.17 K/UL (ref 0.05–0.5)
EOSINOPHILS RELATIVE PERCENT: 2 % (ref 0–6)
ERYTHROCYTE [DISTWIDTH] IN BLOOD BY AUTOMATED COUNT: 13.3 % (ref 11.5–15)
GFR, ESTIMATED: 52 ML/MIN/1.73M2
GLUCOSE SERPL-MCNC: 101 MG/DL (ref 74–99)
HBA1C MFR BLD: 5.4 % (ref 4–5.6)
HCT VFR BLD AUTO: 57.5 % (ref 37–54)
HDLC SERPL-MCNC: 41 MG/DL
HGB BLD-MCNC: 18.4 G/DL (ref 12.5–16.5)
IMM GRANULOCYTES # BLD AUTO: 0.03 K/UL (ref 0–0.58)
IMM GRANULOCYTES NFR BLD: 0 % (ref 0–5)
LDLC SERPL CALC-MCNC: 57 MG/DL
LYMPHOCYTES NFR BLD: 1.23 K/UL (ref 1.5–4)
LYMPHOCYTES RELATIVE PERCENT: 17 % (ref 20–42)
MCH RBC QN AUTO: 30.9 PG (ref 26–35)
MCHC RBC AUTO-ENTMCNC: 32 G/DL (ref 32–34.5)
MCV RBC AUTO: 96.6 FL (ref 80–99.9)
MONOCYTES NFR BLD: 0.61 K/UL (ref 0.1–0.95)
MONOCYTES NFR BLD: 8 % (ref 2–12)
NEUTROPHILS NFR BLD: 72 % (ref 43–80)
NEUTS SEG NFR BLD: 5.33 K/UL (ref 1.8–7.3)
PLATELET # BLD AUTO: 176 K/UL (ref 130–450)
PMV BLD AUTO: 9.3 FL (ref 7–12)
POTASSIUM SERPL-SCNC: 4.3 MMOL/L (ref 3.5–5)
PROT SERPL-MCNC: 7.6 G/DL (ref 6.4–8.3)
PSA SERPL-MCNC: 1.49 NG/ML (ref 0–4)
RBC # BLD AUTO: 5.95 M/UL (ref 3.8–5.8)
SODIUM SERPL-SCNC: 137 MMOL/L (ref 132–146)
TRIGL SERPL-MCNC: 160 MG/DL
URATE SERPL-MCNC: 6.9 MG/DL (ref 3.4–7)
VLDLC SERPL CALC-MCNC: 32 MG/DL
WBC OTHER # BLD: 7.4 K/UL (ref 4.5–11.5)

## 2025-03-28 PROCEDURE — 36415 COLL VENOUS BLD VENIPUNCTURE: CPT

## 2025-03-28 PROCEDURE — 83880 ASSAY OF NATRIURETIC PEPTIDE: CPT

## 2025-03-28 PROCEDURE — 80061 LIPID PANEL: CPT

## 2025-03-28 PROCEDURE — 83036 HEMOGLOBIN GLYCOSYLATED A1C: CPT

## 2025-03-28 PROCEDURE — 84550 ASSAY OF BLOOD/URIC ACID: CPT

## 2025-03-28 PROCEDURE — 80053 COMPREHEN METABOLIC PANEL: CPT

## 2025-03-28 PROCEDURE — 85025 COMPLETE CBC W/AUTO DIFF WBC: CPT

## 2025-03-28 PROCEDURE — G0103 PSA SCREENING: HCPCS

## 2025-04-11 ENCOUNTER — TELEPHONE (OUTPATIENT)
Dept: CARDIOLOGY CLINIC | Age: 65
End: 2025-04-11

## 2025-04-11 NOTE — TELEPHONE ENCOUNTER
Patient needs clearance for Exc  scc right helix of  ear on 4/18/25 at [facility] under general anesthesia ricardo  done of Dr. Andi Hernandez. Patient  needs to hold Eliquis for 2 days and aspirin for 5 days pre op .    Patient denies any chest pain, shortness of breath or palpitations since last visit in 4/18/24.  Patient is able to complete all activities of daily living, including; climbing one flight of stairs and walking one block without cardiac symptoms. Please advise.

## 2025-04-18 ENCOUNTER — HOSPITAL ENCOUNTER (OUTPATIENT)
Age: 65
Discharge: HOME | End: 2025-04-20

## 2025-04-29 LAB — SURGICAL PATHOLOGY REPORT: NORMAL
